# Patient Record
Sex: FEMALE | Race: WHITE | NOT HISPANIC OR LATINO | Employment: FULL TIME | URBAN - METROPOLITAN AREA
[De-identification: names, ages, dates, MRNs, and addresses within clinical notes are randomized per-mention and may not be internally consistent; named-entity substitution may affect disease eponyms.]

---

## 2017-12-22 ENCOUNTER — OFFICE VISIT (OUTPATIENT)
Dept: FAMILY MEDICINE | Facility: CLINIC | Age: 42
End: 2017-12-22
Payer: COMMERCIAL

## 2017-12-22 VITALS
BODY MASS INDEX: 38.57 KG/M2 | HEART RATE: 110 BPM | DIASTOLIC BLOOD PRESSURE: 92 MMHG | TEMPERATURE: 99.2 F | WEIGHT: 240 LBS | SYSTOLIC BLOOD PRESSURE: 133 MMHG | HEIGHT: 66 IN

## 2017-12-22 DIAGNOSIS — J02.9 VIRAL PHARYNGITIS: Primary | ICD-10-CM

## 2017-12-22 DIAGNOSIS — R07.0 THROAT PAIN: ICD-10-CM

## 2017-12-22 LAB
DEPRECATED S PYO AG THROAT QL EIA: NORMAL
SPECIMEN SOURCE: NORMAL

## 2017-12-22 PROCEDURE — 87880 STREP A ASSAY W/OPTIC: CPT | Performed by: PHYSICIAN ASSISTANT

## 2017-12-22 PROCEDURE — 99213 OFFICE O/P EST LOW 20 MIN: CPT | Performed by: PHYSICIAN ASSISTANT

## 2017-12-22 PROCEDURE — 87081 CULTURE SCREEN ONLY: CPT | Performed by: PHYSICIAN ASSISTANT

## 2017-12-22 NOTE — NURSING NOTE
"Chief Complaint   Patient presents with     Pharyngitis       Initial BP (!) 133/92  Pulse 110  Temp 99.2  F (37.3  C) (Tympanic)  Ht 5' 6\" (1.676 m)  Wt 240 lb (108.9 kg)  LMP 12/15/2017 (Approximate)  Breastfeeding? No  BMI 38.74 kg/m2 Estimated body mass index is 38.74 kg/(m^2) as calculated from the following:    Height as of this encounter: 5' 6\" (1.676 m).    Weight as of this encounter: 240 lb (108.9 kg).  Medication Reconciliation: complete     Irma Morales MA      "

## 2017-12-22 NOTE — PROGRESS NOTES
SUBJECTIVE:   Daylin Amaya is a 42 year old female who presents to clinic today for the following health issues:      ENT Symptoms             Symptoms: cc Present Absent Comment   Fever/Chills   x    Fatigue  x     Muscle Aches   x    Eye Irritation   x    Sneezing  x     Nasal Yunior/Drg  x     Sinus Pressure/Pain  x     Loss of smell   x    Dental pain   x    Sore Throat  x     Swollen Glands  x     Ear Pain/Fullness  x  right   Cough   x    Wheeze   x    Chest Pain   x    Shortness of breath   x    Rash   x    Other  x       Symptom duration:  this morning   Symptom severity:  moderate   Treatments tried:  None    Contacts:  son vomited last night        Right ear and sinus pressure.  No cough.        Problem list and histories reviewed & adjusted, as indicated.  Additional history: as documented    Patient Active Problem List   Diagnosis     CARDIOVASCULAR SCREENING; LDL GOAL LESS THAN 160     24 hour contact handout given     History reviewed. No pertinent surgical history.    Social History   Substance Use Topics     Smoking status: Never Smoker     Smokeless tobacco: Never Used     Alcohol use No     Family History   Problem Relation Age of Onset     Thyroid Disease Mother      CANCER Father      Thyroid Disease Sister      Allergies Son      food allergies, sees Dr Ambrocio         Current Outpatient Prescriptions   Medication Sig Dispense Refill     acetaminophen-codeine 120-12 MG/5ML suspension Take 5 mLs by mouth every 6 hours as needed for moderate pain 420 mL 0     albuterol (PROAIR HFA, PROVENTIL HFA, VENTOLIN HFA) 108 (90 BASE) MCG/ACT inhaler Inhale 2 puffs into the lungs every 4 hours as needed for shortness of breath / dyspnea or wheezing 1 Inhaler 3     sertraline (ZOLOFT) 50 MG tablet Take 50 mg by mouth daily       melatonin 3 MG tablet Take 3 mg by mouth At Bedtime       guaiFENesin-codeine (ROBITUSSIN AC) 100-10 MG/5ML SOLN Take 5 mLs by mouth every 4 hours as needed for cough (Patient not  "taking: Reported on 12/22/2017) 240 mL 0     ibuprofen (RA IBUPROFEN) 200 MG capsule Take 200-600 mg by mouth every 6 hours as needed       norgestrel-ethinyl estradiol (CRYSELLE-28) 0.3-30 MG-MCG per tablet Take 1 tablet by mouth daily       loratadine (CLARITIN) 10 MG tablet Take 10 mg by mouth daily       benzonatate (TESSALON) 200 MG capsule Take 1 capsule (200 mg) by mouth 3 times daily as needed for cough (Patient not taking: Reported on 12/22/2017) 21 capsule 0     BP Readings from Last 3 Encounters:   12/22/17 (!) 133/92   11/17/16 124/68   11/07/16 136/80    Wt Readings from Last 3 Encounters:   12/22/17 240 lb (108.9 kg)   11/17/16 243 lb 3.2 oz (110.3 kg)   11/07/16 239 lb 3.2 oz (108.5 kg)                        Reviewed and updated as needed this visit by clinical staffTobacco  Allergies  Meds  Med Hx  Surg Hx  Fam Hx  Soc Hx      Reviewed and updated as needed this visit by Provider         ROS:  Constitutional, HEENT, cardiovascular, pulmonary, GI, , musculoskeletal, neuro, skin, endocrine and psych systems are negative, except as otherwise noted.      OBJECTIVE:   BP (!) 133/92  Pulse 110  Temp 99.2  F (37.3  C) (Tympanic)  Ht 5' 6\" (1.676 m)  Wt 240 lb (108.9 kg)  LMP 12/15/2017 (Approximate)  Breastfeeding? No  BMI 38.74 kg/m2  Body mass index is 38.74 kg/(m^2).  GENERAL: healthy, alert and no distress  EYES: Eyes grossly normal to inspection, PERRL and conjunctivae and sclerae normal  HENT: ear canals and TM's normal, nose and mouth without ulcers or lesions, tonsils erythematous, no exudates.  NECK: no adenopathy, no asymmetry, masses, or scars and thyroid normal to palpation  RESP: lungs clear to auscultation - no rales, rhonchi or wheezes  CV: regular rate and rhythm, normal S1 S2, no S3 or S4, no murmur, click or rub, no peripheral edema and peripheral pulses strong  ABDOMEN: soft, nontender, no hepatosplenomegaly, no masses and bowel sounds normal  MS: no gross musculoskeletal " defects noted, no edema    Diagnostic Test Results:  Results for orders placed or performed in visit on 12/22/17 (from the past 24 hour(s))   Rapid strep screen   Result Value Ref Range    Specimen Description Throat     Rapid Strep A Screen       NEGATIVE: No Group A streptococcal antigen detected by immunoassay, await culture report.       ASSESSMENT/PLAN:         1. Viral pharyngitis  Pharyngitis  (primary encounter diagnosis)     I discussed the pathophysiology of pharyngitis and likely viral etiology.   Discussed general respiratory tract infection care including importance of hydration, rest, over the counter therapies and techniques to prevent future infection as well as transmission to others.  Discussed signs or symptoms that would indicate need for recheck.    Patient was instructed to f/u or call if symptoms worsen or fail to improve as anticipated.      I warned Daylin Amaya that tylenol with codeine may cause drowsiness and she is not to drive or operate heavy machinery after taking this medication.     - acetaminophen-codeine 120-12 MG/5ML suspension; Take 5 mLs by mouth every 6 hours as needed for moderate pain  Dispense: 420 mL; Refill: 0    2. Throat pain  Rapid strep was neg, will call if culture is positive   - Rapid strep screen  - Beta strep group A culture    FUTURE APPOINTMENTS:       - Follow-up visit if symptoms worsen or fail to improve as anticipated.     Mey Vilchis PA-C  [facility]

## 2017-12-22 NOTE — PATIENT INSTRUCTIONS
Self-Care for Sore Throats    Sore throats happen for many reasons, such as colds, allergies, and infections caused by viruses or bacteria. In any case, your throat becomes red and sore. Your goal for self-care is to reduce your discomfort while giving your throat a chance to heal.  Moisten and soothe your throat  Tips include the following:    Try a sip of water first thing after waking up.    Keep your throat moist by drinking 6 or more glasses of clear liquids every day.    Run a cool-air humidifier in your room overnight.    Avoid cigarette smoke.     Suck on throat lozenges, cough drops, hard candy, ice chips, or frozen fruit-juice bars. Use the sugar-free versions if your diet or medical condition requires them.  Gargle to ease irritation  Gargling every hour or 2 can ease irritation. Try gargling with 1 of these solutions:    1/4 teaspoon of salt in 1/2 cup of warm water    An over-the-counter anesthetic gargle  Use medicine for more relief  Over-the-counter medicine can reduce sore throat symptoms. Ask your pharmacist if you have questions about which medicine to use:    Ease pain with anesthetic sprays. Aspirin or an aspirin substitute also helps. Remember, never give aspirin to anyone 18 or younger, or if you are already taking blood thinners.     For sore throats caused by allergies, try antihistamines to block the allergic reaction.    Remember: unless a sore throat is caused by a bacterial infection, antibiotics won t help you.  Prevent future sore throats  Prevention tips include the following:    Stop smoking or reduce contact with secondhand smoke. Smoke irritates the tender throat lining.    Limit contact with pets and with allergy-causing substances, such as pollen and mold.    When you re around someone with a sore throat or cold, wash your hands often to keep viruses or bacteria from spreading.    Don t strain your vocal cords.  Call your healthcare provider  Contact your healthcare provider if  you have:    A temperature over 101 F (38.3 C)    White spots on the throat    Great difficulty swallowing    Trouble breathing    A skin rash    Recent exposure to someone else with strep bacteria    Severe hoarseness and swollen glands in the neck or jaw   Date Last Reviewed: 8/1/2016 2000-2017 [employer]. [address] [place], [place] [id number]. All rights reserved. This information is not intended as a substitute for professional medical care. Always follow your healthcare professional's instructions.

## 2017-12-22 NOTE — MR AVS SNAPSHOT
After Visit Summary   12/22/2017    Daylin Amaya    MRN: [id number]           Patient Information     Date Of Birth          1975        Visit Information        Provider Department      12/22/2017 11:00 AM Mey Vilchis PA-C [facility]        Today's Diagnoses     Viral pharyngitis    -  1    Throat pain          Care Instructions      Self-Care for Sore Throats    Sore throats happen for many reasons, such as colds, allergies, and infections caused by viruses or bacteria. In any case, your throat becomes red and sore. Your goal for self-care is to reduce your discomfort while giving your throat a chance to heal.  Moisten and soothe your throat  Tips include the following:    Try a sip of water first thing after waking up.    Keep your throat moist by drinking 6 or more glasses of clear liquids every day.    Run a cool-air humidifier in your room overnight.    Avoid cigarette smoke.     Suck on throat lozenges, cough drops, hard candy, ice chips, or frozen fruit-juice bars. Use the sugar-free versions if your diet or medical condition requires them.  Gargle to ease irritation  Gargling every hour or 2 can ease irritation. Try gargling with 1 of these solutions:    1/4 teaspoon of salt in 1/2 cup of warm water    An over-the-counter anesthetic gargle  Use medicine for more relief  Over-the-counter medicine can reduce sore throat symptoms. Ask your pharmacist if you have questions about which medicine to use:    Ease pain with anesthetic sprays. Aspirin or an aspirin substitute also helps. Remember, never give aspirin to anyone 18 or younger, or if you are already taking blood thinners.     For sore throats caused by allergies, try antihistamines to block the allergic reaction.    Remember: unless a sore throat is caused by a bacterial infection, antibiotics won t help you.  Prevent future sore throats  Prevention tips include the following:    Stop smoking or reduce  contact with secondhand smoke. Smoke irritates the tender throat lining.    Limit contact with pets and with allergy-causing substances, such as pollen and mold.    When you re around someone with a sore throat or cold, wash your hands often to keep viruses or bacteria from spreading.    Don t strain your vocal cords.  Call your healthcare provider  Contact your healthcare provider if you have:    A temperature over 101 F (38.3 C)    White spots on the throat    Great difficulty swallowing    Trouble breathing    A skin rash    Recent exposure to someone else with strep bacteria    Severe hoarseness and swollen glands in the neck or jaw   Date Last Reviewed: 8/1/2016 2000-2017 [employer]. [address] [place] [address]. All rights reserved. This information is not intended as a substitute for professional medical care. Always follow your healthcare professional's instructions.                Follow-ups after your visit        Who to contact     Normal or non-critical lab and imaging results will be communicated to you by [employer], letter or phone within 4 business days after the clinic has received the results. If you do not hear from us within 7 days, please contact the clinic through [employer] or phone. If you have a critical or abnormal lab result, we will notify you by phone as soon as possible.  Submit refill requests through [employer] or call your pharmacy and they will forward the refill request to us. Please allow 3 business days for your refill to be completed.          If you need to speak with a  for additional information , please call: [phone]           Additional Information About Your Visit        [employer] Information     [employer] gives you secure access to your electronic health record. If you see a primary care provider, you can also send messages to your care team and make appointments. If you have questions, please call your primary care clinic.  If you  "do not have a primary care provider, please call [phone] and they will assist you.        Care EveryWhere ID     This is your Care EveryWhere ID. This could be used by other organizations to access your [facility] medical records  [id number]        Your Vitals Were     Pulse Temperature Height Last Period Breastfeeding? BMI (Body Mass Index)    110 99.2  F (37.3  C) (Tympanic) 5' 6\" (1.676 m) 12/15/2017 (Approximate) No 38.74 kg/m2       Blood Pressure from Last 3 Encounters:   12/22/17 (!) 133/92   11/17/16 124/68   11/07/16 136/80    Weight from Last 3 Encounters:   12/22/17 240 lb (108.9 kg)   11/17/16 243 lb 3.2 oz (110.3 kg)   11/07/16 239 lb 3.2 oz (108.5 kg)              We Performed the Following     Beta strep group A culture     Rapid strep screen          Today's Medication Changes          These changes are accurate as of: 12/22/17 11:40 AM.  If you have any questions, ask your nurse or doctor.               Start taking these medicines.        Dose/Directions    acetaminophen-codeine 120-12 MG/5ML suspension   Used for:  Viral pharyngitis   Started by:  Mey Vilchis PA-C        Dose:  5 mL   Take 5 mLs by mouth every 6 hours as needed for moderate pain   Quantity:  420 mL   Refills:  0            Where to get your medicines      Some of these will need a paper prescription and others can be bought over the counter.  Ask your nurse if you have questions.     Bring a paper prescription for each of these medications     acetaminophen-codeine 120-12 MG/5ML suspension                Primary Care Provider Office Phone # Fax #    TIFFANY Brooks [facility] [phone] [place] [address]        Equal Access to Services     EDWIN MOORE AH: Carmen Negrete, waaxda luqadaha, qaybta kaalmada adeflorecitayada, sofi snyder. So [facility] [phone].    ATENCIÓN: Si habla español, tiene a lakhani disposición servicios gratuitos de " asistencia lingüística. Cristian al [phone].    We comply with applicable federal civil rights laws and [place] laws. We do not discriminate on the basis of race, color, national origin, age, disability, sex, sexual orientation, or gender identity.            Thank you!     Thank you for choosing [facility]  for your care. Our goal is always to provide you with excellent care. Hearing back from our patients is one way we can continue to improve our services. Please take a few minutes to complete the written survey that you may receive in the mail after your visit with us. Thank you!             Your Updated Medication List - Protect others around you: Learn how to safely use, store and throw away your medicines at www.disposemymeds.org.          This list is accurate as of: 12/22/17 11:40 AM.  Always use your most recent med list.                   Brand Name Dispense Instructions for use Diagnosis    acetaminophen-codeine 120-12 MG/5ML suspension     420 mL    Take 5 mLs by mouth every 6 hours as needed for moderate pain    Viral pharyngitis       albuterol 108 (90 BASE) MCG/ACT Inhaler    PROAIR HFA/PROVENTIL HFA/VENTOLIN HFA    1 Inhaler    Inhale 2 puffs into the lungs every 4 hours as needed for shortness of breath / dyspnea or wheezing    Acute bronchitis, unspecified organism       benzonatate 200 MG capsule    TESSALON    21 capsule    Take 1 capsule (200 mg) by mouth 3 times daily as needed for cough    Acute bronchitis, unspecified organism       CRYSELLE-28 0.3-30 MG-MCG per tablet   Generic drug:  norgestrel-ethinyl estradiol      Take 1 tablet by mouth daily        guaiFENesin-codeine 100-10 MG/5ML Soln solution    ROBITUSSIN AC    240 mL    Take 5 mLs by mouth every 4 hours as needed for cough    Acute bronchitis, unspecified organism       loratadine 10 MG tablet    CLARITIN     Take 10 mg by mouth daily        melatonin 3 MG tablet      Take 3 mg by mouth At Bedtime        RA  IBUPROFEN 200 MG capsule   Generic drug:  ibuprofen      Take 200-600 mg by mouth every 6 hours as needed        sertraline 50 MG tablet    ZOLOFT     Take 50 mg by mouth daily

## 2017-12-23 LAB
BACTERIA SPEC CULT: NORMAL
SPECIMEN SOURCE: NORMAL

## 2019-05-24 LAB — TSH SERPL-ACNC: 2.3 UIU/ML (ref 0.35–4.94)

## 2019-11-04 ENCOUNTER — HEALTH MAINTENANCE LETTER (OUTPATIENT)
Age: 44
End: 2019-11-04

## 2020-02-16 ENCOUNTER — HEALTH MAINTENANCE LETTER (OUTPATIENT)
Age: 45
End: 2020-02-16

## 2020-07-28 LAB
HPV ABSTRACT: NORMAL
PAP-ABSTRACT: NORMAL

## 2020-11-22 ENCOUNTER — HEALTH MAINTENANCE LETTER (OUTPATIENT)
Age: 45
End: 2020-11-22

## 2021-02-13 ENCOUNTER — HEALTH MAINTENANCE LETTER (OUTPATIENT)
Age: 46
End: 2021-02-13

## 2021-04-28 ENCOUNTER — TELEPHONE (OUTPATIENT)
Dept: FAMILY MEDICINE | Facility: CLINIC | Age: 46
End: 2021-04-28

## 2021-04-28 NOTE — TELEPHONE ENCOUNTER
"Message left on patient's answering machine to call the [facility] RN back. Need to triage her reason for the appointment that she scheduled for 4/30/21 with Deyanira Jackson at 10:40 AM. \"I fell a few weeks ago and I have a sore calf with swelling thatI would like looked at. Nothing broken as I can walk just fine.\"  Sharan Benjamin RN   "

## 2021-04-29 NOTE — TELEPHONE ENCOUNTER
Call placed to patient.  Voicemail message left requesting call back clinic RN to discuss symptoms regarding scheduled visit tomorrow as she may need a higher level of care for this evaluation.  Call back number given, option 2 care team.  Aimee Benitez RN

## 2021-04-30 ENCOUNTER — OFFICE VISIT (OUTPATIENT)
Dept: FAMILY MEDICINE | Facility: CLINIC | Age: 46
End: 2021-04-30
Payer: COMMERCIAL

## 2021-04-30 ENCOUNTER — ANCILLARY PROCEDURE (OUTPATIENT)
Dept: GENERAL RADIOLOGY | Facility: CLINIC | Age: 46
End: 2021-04-30
Attending: PHYSICIAN ASSISTANT
Payer: COMMERCIAL

## 2021-04-30 VITALS
TEMPERATURE: 99.4 F | WEIGHT: 257.3 LBS | SYSTOLIC BLOOD PRESSURE: 138 MMHG | RESPIRATION RATE: 15 BRPM | HEIGHT: 66 IN | BODY MASS INDEX: 41.35 KG/M2 | HEART RATE: 79 BPM | DIASTOLIC BLOOD PRESSURE: 82 MMHG | OXYGEN SATURATION: 97 %

## 2021-04-30 DIAGNOSIS — M79.662 PAIN OF LEFT LOWER LEG: ICD-10-CM

## 2021-04-30 DIAGNOSIS — M79.662 PAIN OF LEFT LOWER LEG: Primary | ICD-10-CM

## 2021-04-30 DIAGNOSIS — L91.8 SKIN TAG: ICD-10-CM

## 2021-04-30 PROBLEM — N84.0 UTERINE POLYP: Status: ACTIVE | Noted: 2019-06-10

## 2021-04-30 PROCEDURE — 11200 RMVL SKIN TAGS UP TO&INC 15: CPT | Performed by: PHYSICIAN ASSISTANT

## 2021-04-30 PROCEDURE — 99213 OFFICE O/P EST LOW 20 MIN: CPT | Mod: 25 | Performed by: PHYSICIAN ASSISTANT

## 2021-04-30 PROCEDURE — 73590 X-RAY EXAM OF LOWER LEG: CPT | Mod: LT | Performed by: RADIOLOGY

## 2021-04-30 RX ORDER — NORETHINDRONE ACETATE AND ETHINYL ESTRADIOL .02; 1 MG/1; MG/1
TABLET ORAL
COMMUNITY
Start: 2021-03-25 | End: 2022-04-28

## 2021-04-30 ASSESSMENT — MIFFLIN-ST. JEOR: SCORE: 1828.86

## 2021-04-30 NOTE — PROGRESS NOTES
"    Assessment & Plan     ASSESSMENT/PLAN:      ICD-10-CM    1. Pain of left lower leg  M79.662 XR Tibia & Fibula Left 2 Views   2. Skin tag  L91.8 REMOVAL OF SKIN TAGS, FIRST 15     Discussed options for swelling/injury/hematoma: patient elected to give it time. Can try heat/ice/light massage, keep stretching ankle. She will let me know if not improving, consider ultrasound or physical therapy. Signs to be seen again were discussed.    Return in about 2 weeks (around 5/14/2021) for if not improving or if worsening.    DENISE Jane [facility] DAMARIS Mao is a 45 year old who presents for the following health issues     HPI   *  I fell a few weeks ago and I have a sore calf with swelling that I would like   looked at. Nothing is broken as I can walk just fine. I also have a few skin   tags near my bra line that I would like removed that hurt due to the location.    About amonth ago she tumbled while hiking, hit left shin area.   She walks 3 miles at lunch daily, this is okay but it is painful  Was vacuuming last week, any pressure to this area hurts  Doesn't hurt to move ankle      Review of Systems   Other than noted above, general, HEENT, respiratory, cardiac, MS, and gastrointestinal systems are negative.       Objective    /82   Pulse 79   Temp 99.4  F (37.4  C) (Tympanic)   Resp 15   Ht 1.676 m (5' 6\")   Wt 116.7 kg (257 lb 4.8 oz)   SpO2 97%   BMI 41.53 kg/m    Body mass index is 41.53 kg/m .  Physical Exam   GENERAL: healthy, alert and no distress  RESP: lungs clear to auscultation - no rales, rhonchi or wheezes  CV: regular rate and rhythm, normal S1 S2, no S3 or S4, no murmur, click or rub, no peripheral edema and peripheral pulses strong  ABDOMEN: soft, nontender, no hepatosplenomegaly, no masses and bowel sounds normal  MS:  no edema  MS: POSITIVE left lower leg shows tender swelling over proximal tibia, no ecchymosis seen, approximately the size of a " palm. No erythema, induration, edema, no calf swelling or tenderness   SKIN: POSITIVE two skin tags on stalks right axilla, 1 small skin tag left axilla  NEURO: Normal strength and tone, mentation intact and speech normal    After cleaning with alcohol and using a sharp iris scissors, 3 skin tags were removed without complication. Hemostasis achieved with pressure, dressed with antibiotic cream and bandaids.       Xr Tibia & Fibula Left 2 Views    Result Date: 4/30/2021  XR LEFT TIBIA AND FIBULA TWO VIEWS  4/30/2021 11:46 AM HISTORY: Fall 1 month ago, swelling to shin/pain. Pain of left lower leg. COMPARISON: None.     IMPRESSION: Proximal and distal tibial and fibular articulations intact. No acute fracture. RENAY WALTERS MD

## 2021-08-21 ENCOUNTER — HEALTH MAINTENANCE LETTER (OUTPATIENT)
Age: 46
End: 2021-08-21

## 2021-09-19 ENCOUNTER — HEALTH MAINTENANCE LETTER (OUTPATIENT)
Age: 46
End: 2021-09-19

## 2021-10-16 ENCOUNTER — HEALTH MAINTENANCE LETTER (OUTPATIENT)
Age: 46
End: 2021-10-16

## 2021-12-23 ENCOUNTER — TRANSFERRED RECORDS (OUTPATIENT)
Dept: MULTI SPECIALTY CLINIC | Facility: CLINIC | Age: 46
End: 2021-12-23
Payer: COMMERCIAL

## 2021-12-23 LAB
CHOLESTEROL (EXTERNAL): 152 MG/DL (ref 100–199)
GLUCOSE (EXTERNAL): 110 MG/DL (ref 65–100)
HBA1C MFR BLD: 5.8 %
HDLC SERPL-MCNC: 50 MG/DL
LDL CHOLESTEROL (EXTERNAL): 86 MG/DL
NON HDL CHOLESTEROL (EXTERNAL): 102 MG/DL
TRIGLYCERIDES (EXTERNAL): 81 MG/DL

## 2021-12-27 ENCOUNTER — IMMUNIZATION (OUTPATIENT)
Dept: FAMILY MEDICINE | Facility: CLINIC | Age: 46
End: 2021-12-27
Payer: COMMERCIAL

## 2021-12-27 PROCEDURE — 0004A PR COVID VAC PFIZER DIL RECON 30 MCG/0.3 ML IM: CPT

## 2021-12-27 PROCEDURE — 91300 PR COVID VAC PFIZER DIL RECON 30 MCG/0.3 ML IM: CPT

## 2022-01-08 ENCOUNTER — HEALTH MAINTENANCE LETTER (OUTPATIENT)
Age: 47
End: 2022-01-08

## 2022-04-28 ENCOUNTER — OFFICE VISIT (OUTPATIENT)
Dept: FAMILY MEDICINE | Facility: CLINIC | Age: 47
End: 2022-04-28
Payer: COMMERCIAL

## 2022-04-28 ENCOUNTER — HOSPITAL ENCOUNTER (OUTPATIENT)
Dept: ULTRASOUND IMAGING | Facility: CLINIC | Age: 47
Discharge: HOME | End: 2022-04-28
Attending: FAMILY MEDICINE | Admitting: FAMILY MEDICINE
Payer: COMMERCIAL

## 2022-04-28 VITALS
WEIGHT: 261.6 LBS | SYSTOLIC BLOOD PRESSURE: 131 MMHG | RESPIRATION RATE: 20 BRPM | BODY MASS INDEX: 42.22 KG/M2 | HEART RATE: 87 BPM | TEMPERATURE: 97.1 F | DIASTOLIC BLOOD PRESSURE: 77 MMHG

## 2022-04-28 DIAGNOSIS — M79.89 PAIN AND SWELLING OF LEFT LOWER EXTREMITY: ICD-10-CM

## 2022-04-28 DIAGNOSIS — I80.02 SUPERFICIAL THROMBOPHLEBITIS OF LEFT LEG: ICD-10-CM

## 2022-04-28 DIAGNOSIS — M79.605 PAIN AND SWELLING OF LEFT LOWER EXTREMITY: ICD-10-CM

## 2022-04-28 DIAGNOSIS — M79.89 PAIN AND SWELLING OF LEFT LOWER EXTREMITY: Primary | ICD-10-CM

## 2022-04-28 DIAGNOSIS — M79.605 PAIN AND SWELLING OF LEFT LOWER EXTREMITY: Primary | ICD-10-CM

## 2022-04-28 PROCEDURE — 93971 EXTREMITY STUDY: CPT | Mod: LT

## 2022-04-28 PROCEDURE — 99215 OFFICE O/P EST HI 40 MIN: CPT | Performed by: FAMILY MEDICINE

## 2022-04-28 ASSESSMENT — ENCOUNTER SYMPTOMS: LEG PAIN: 1

## 2022-04-28 NOTE — PROGRESS NOTES
"  Assessment & Plan     Pain and swelling of left lower extremity  Erythema is not particularly concerning for cellulitis or erysipelas.  Patient has no history of MRSA and is low risk for this.  No fever or other systemic symptoms.  Risk factors for blood clot include obesity, sedentary job, and OCP use.  She is a non-smoker.  We will send her for a hold and call ultrasound to rule out left lower extremity DVT.  If no DVT is found, consider antibiotics for possible cellulitis.  - US Lower Extremity Venous Duplex Left; Future      Addendum: Hold and call ultrasound revealed an extensive superficial clot in the greater saphenous vein.  This traversed the saphenous popliteal junction, thusly will treat with therapeutic anticoagulation per DVT protocol.  Patient will stop her OCP and start Xarelto 15 mg twice daily for 21 days, then 20 mg daily for a total of 3 months.  Discussed that this is likely a provoked clot due to sedentary job and OCP.  She will work with her OB/GYN if she has menorrhagia after stopping her OCP.  Follow-up for repeat ultrasound only if redness and pain persist past 1 to 2 weeks.        A total of 40 minutes was spent on the date of the encounter and reviewing the chart, examining the patient, speaking with the radiologist, and discussing treatment with patient over the phone.       BMI:   Estimated body mass index is 42.22 kg/m  as calculated from the following:    Height as of 4/30/21: 1.676 m (5' 6\").    Weight as of this encounter: 118.7 kg (261 lb 9.6 oz).           Return in about 4 weeks (around 5/26/2022) for Routine preventive.    Reema Amaya MD  [facility]    Dany Mao is a 46 year old who presents for the following health issues     Leg Pain    History of Present Illness       Reason for visit:  Skin swelled on back of leg, little painful and has not improved  Symptom onset:  1-3 days ago  Symptoms include:  Tender and swollen area back of " leg  Symptom intensity:  Moderate  Symptom progression:  Worsening  Had these symptoms before:  No  What makes it worse:  Bending leg  What makes it better:  Feet up    She eats 2-3 servings of fruits and vegetables daily.She consumes 0 sweetened beverage(s) daily.She exercises with enough effort to increase her heart rate 30 to 60 minutes per day.  She exercises with enough effort to increase her heart rate 4 days per week.   She is taking medications regularly.     Patient presents today with a concern for possible cellulitis of her left lower extremity.  She states that over the past 4 days, she has noticed a mild amount of redness to the posterior aspect of the left knee.  Last night, this seemed to spread into her calf and up into her thigh along with some pain in those areas.  The area seemed more red.  She has had a skin infection once in the past in her finger that seemed to spread up her arm and required oral antibiotic treatment.  She does not remember that this was cultured.  She has not been hospitalized recently, is not immunosuppressed.  She denies recent fevers or chills, has been feeling well.  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  She is taking an OCP for menorrhagia.  She is a non-smoker.  She has never had a blood clot in the past.      Review of Systems   Constitutional, HEENT, cardiovascular, pulmonary, gi and gu systems are negative, except as otherwise noted.      Objective    /77 (BP Location: Left arm, Patient Position: Sitting, Cuff Size: Adult Large)   Pulse 87   Temp 97.1  F (36.2  C) (Oral)   Resp 20   Wt 118.7 kg (261 lb 9.6 oz)   BMI 42.22 kg/m    Body mass index is 42.22 kg/m .  Physical Exam   GENERAL: healthy, alert and no distress  RESP: Breathing unlabored, no cough during the visit  MS: Patient has somewhat large calf muscles bilaterally, no obvious increase in diameter of the left calf compared  to the right, but possibly some increased mottled appearance to the skin along with tenderness in the popliteal fossa and inner thigh on the left  SKIN: Increased mottled appearance to the left calf compared to the right, stretching up to the medial aspect of the knee and medial thigh, no angry erythema, no obvious warmth, no open areas  NEURO: Normal strength and tone, mentation intact and speech normal  PSYCH: mentation appears normal, affect normal/bright    Diagnostics: Left lower extremity ultrasound pending

## 2022-05-10 ENCOUNTER — MYC MEDICAL ADVICE (OUTPATIENT)
Dept: FAMILY MEDICINE | Facility: CLINIC | Age: 47
End: 2022-05-10
Payer: COMMERCIAL

## 2022-05-11 ENCOUNTER — NURSE TRIAGE (OUTPATIENT)
Dept: FAMILY MEDICINE | Facility: CLINIC | Age: 47
End: 2022-05-11
Payer: COMMERCIAL

## 2022-05-11 ENCOUNTER — APPOINTMENT (OUTPATIENT)
Dept: ULTRASOUND IMAGING | Facility: CLINIC | Age: 47
End: 2022-05-11
Attending: NURSE PRACTITIONER
Payer: COMMERCIAL

## 2022-05-11 ENCOUNTER — HOSPITAL ENCOUNTER (EMERGENCY)
Facility: CLINIC | Age: 47
Discharge: HOME | End: 2022-05-11
Attending: NURSE PRACTITIONER | Admitting: NURSE PRACTITIONER
Payer: COMMERCIAL

## 2022-05-11 VITALS
OXYGEN SATURATION: 99 % | WEIGHT: 255 LBS | HEIGHT: 67 IN | BODY MASS INDEX: 40.02 KG/M2 | RESPIRATION RATE: 16 BRPM | SYSTOLIC BLOOD PRESSURE: 125 MMHG | TEMPERATURE: 97.5 F | HEART RATE: 96 BPM | DIASTOLIC BLOOD PRESSURE: 82 MMHG

## 2022-05-11 DIAGNOSIS — N93.9 VAGINAL BLEEDING: ICD-10-CM

## 2022-05-11 LAB
ALBUMIN SERPL-MCNC: 3.2 G/DL (ref 3.4–5)
ALP SERPL-CCNC: 61 U/L (ref 40–150)
ALT SERPL W P-5'-P-CCNC: 31 U/L (ref 0–50)
ANION GAP SERPL CALCULATED.3IONS-SCNC: 4 MMOL/L (ref 3–14)
AST SERPL W P-5'-P-CCNC: 17 U/L (ref 0–45)
BASOPHILS # BLD AUTO: 0 10E3/UL (ref 0–0.2)
BASOPHILS NFR BLD AUTO: 1 %
BILIRUB SERPL-MCNC: 0.2 MG/DL (ref 0.2–1.3)
BUN SERPL-MCNC: 12 MG/DL (ref 7–30)
CALCIUM SERPL-MCNC: 8.6 MG/DL (ref 8.5–10.1)
CHLORIDE BLD-SCNC: 110 MMOL/L (ref 94–109)
CO2 SERPL-SCNC: 27 MMOL/L (ref 20–32)
CREAT SERPL-MCNC: 0.78 MG/DL (ref 0.52–1.04)
EOSINOPHIL # BLD AUTO: 0.1 10E3/UL (ref 0–0.7)
EOSINOPHIL NFR BLD AUTO: 2 %
ERYTHROCYTE [DISTWIDTH] IN BLOOD BY AUTOMATED COUNT: 13.9 % (ref 10–15)
GFR SERPL CREATININE-BSD FRML MDRD: >90 ML/MIN/1.73M2
GLUCOSE BLD-MCNC: 106 MG/DL (ref 70–99)
HCT VFR BLD AUTO: 34.7 % (ref 35–47)
HGB BLD-MCNC: 11.1 G/DL (ref 11.7–15.7)
HOLD SPECIMEN: NORMAL
IMM GRANULOCYTES # BLD: 0.1 10E3/UL
IMM GRANULOCYTES NFR BLD: 1 %
LYMPHOCYTES # BLD AUTO: 3.5 10E3/UL (ref 0.8–5.3)
LYMPHOCYTES NFR BLD AUTO: 42 %
MCH RBC QN AUTO: 28.3 PG (ref 26.5–33)
MCHC RBC AUTO-ENTMCNC: 32 G/DL (ref 31.5–36.5)
MCV RBC AUTO: 89 FL (ref 78–100)
MONOCYTES # BLD AUTO: 0.5 10E3/UL (ref 0–1.3)
MONOCYTES NFR BLD AUTO: 6 %
NEUTROPHILS # BLD AUTO: 4 10E3/UL (ref 1.6–8.3)
NEUTROPHILS NFR BLD AUTO: 48 %
NRBC # BLD AUTO: 0 10E3/UL
NRBC BLD AUTO-RTO: 0 /100
PLATELET # BLD AUTO: 82 10E3/UL (ref 150–450)
POTASSIUM BLD-SCNC: 3.7 MMOL/L (ref 3.4–5.3)
PROT SERPL-MCNC: 6.3 G/DL (ref 6.8–8.8)
RBC # BLD AUTO: 3.92 10E6/UL (ref 3.8–5.2)
SODIUM SERPL-SCNC: 141 MMOL/L (ref 133–144)
WBC # BLD AUTO: 8.3 10E3/UL (ref 4–11)

## 2022-05-11 PROCEDURE — 80053 COMPREHEN METABOLIC PANEL: CPT | Performed by: NURSE PRACTITIONER

## 2022-05-11 PROCEDURE — 99284 EMERGENCY DEPT VISIT MOD MDM: CPT | Performed by: NURSE PRACTITIONER

## 2022-05-11 PROCEDURE — 36415 COLL VENOUS BLD VENIPUNCTURE: CPT | Performed by: EMERGENCY MEDICINE

## 2022-05-11 PROCEDURE — 96360 HYDRATION IV INFUSION INIT: CPT | Performed by: NURSE PRACTITIONER

## 2022-05-11 PROCEDURE — 85025 COMPLETE CBC W/AUTO DIFF WBC: CPT | Performed by: NURSE PRACTITIONER

## 2022-05-11 PROCEDURE — 99284 EMERGENCY DEPT VISIT MOD MDM: CPT | Mod: 25 | Performed by: NURSE PRACTITIONER

## 2022-05-11 PROCEDURE — 96361 HYDRATE IV INFUSION ADD-ON: CPT | Performed by: NURSE PRACTITIONER

## 2022-05-11 PROCEDURE — 93971 EXTREMITY STUDY: CPT | Mod: LT

## 2022-05-11 PROCEDURE — 258N000003 HC RX IP 258 OP 636: Performed by: NURSE PRACTITIONER

## 2022-05-11 RX ORDER — SODIUM CHLORIDE 9 MG/ML
INJECTION, SOLUTION INTRAVENOUS CONTINUOUS
Status: DISCONTINUED | OUTPATIENT
Start: 2022-05-11 | End: 2022-05-11 | Stop reason: HOSPADM

## 2022-05-11 RX ADMIN — SODIUM CHLORIDE 1000 ML: 9 INJECTION, SOLUTION INTRAVENOUS at 14:13

## 2022-05-11 NOTE — TELEPHONE ENCOUNTER
"Called patient and spoke with her regarding [facility] [employer] message on 5/10. Patient states that she been having heavy vaginal bleeding starting on 5/1. Of note, patient was started on Xarelto 4/28; her last dose was 5/10 at 630pm. Bleeding has been getting progressively more constant throughout the last week. Within the last 2 days the bleeding has increased to a severe level with her soaking a tampon and pad every 15 minutes and passing large jelly-like clots the size of half dollar to golf ball. Patient has been able to work from home but is starting to feel worn out and tired stating she \"feels like she should could go lay down\". Denies dizziness or abdominal pain.     Protocol recommended 2nd level triage. Writer was able to talk with Dr. Aquino in clinic who agreed with ED disposition.      Writer advised patient that she needs to go to the emergency room now. Patient agreed with disposition and is having  bring her to ED. Writer advised patient to hold xarelto and avoid ibuprofen/ NSAIDS if she starts having pain.   Writer advised patient to drink fluids and make sure to eat snacks such as granola bars etc.     Reason for Disposition    SEVERE vaginal bleeding (e.g., soaking 2 pads or tampons per hour and present 2 or more hours; 1 menstrual cup every 2 hours)    Additional Information    Negative: SEVERE vaginal bleeding (e.g., continuous red blood from vagina, or large blood clots) and very weak (can't stand)    Negative: Passed out (i.e., fainted, collapsed and was not responding)    Negative: Difficult to awaken or acting confused (e.g., disoriented, slurred speech)    Negative: Shock suspected (e.g., cold/pale/clammy skin, too weak to stand, low BP, rapid pulse)    Negative: Sounds like a life-threatening emergency to the triager    Negative: Pregnant > 20 weeks (5 months or more)    Negative: Pregnant < 20 weeks (less than 5 months)    Negative: Postpartum (from 0 to 6 weeks after delivery)    Negative: " "Vaginal discharge is the main symptom and bleeding is slight    Negative: SEVERE abdominal pain (e.g., excruciating)    Negative: SEVERE dizziness (e.g., unable to stand, requires support to walk, feels like passing out now)    Answer Assessment - Initial Assessment Questions  1. AMOUNT: \"Describe the bleeding that you are having.\"     - SPOTTING: spotting, or pinkish / brownish mucous discharge; does not fill panti-liner or pad     - MILD:  less than 1 pad / hour; less than patient's usual menstrual bleeding    - MODERATE: 1-2 pads / hour; 1 menstrual cup every 6 hours; small-medium blood clots (e.g., pea, grape, small coin)    - SEVERE: soaking 2 or more pads/hour for 2 or more hours; 1 menstrual cup every 2 hours; bleeding not contained by pads or continuous red blood from vagina; large blood clots (e.g., golf ball, large coin)       Severe- soaking pad and tamponevery 15 minutes  2. ONSET: \"When did the bleeding begin?\" \"Is it continuing now?\"      Started 5/1, had started xarelto on 4/28. Has been very heavy 30-40 minutes and in last 2 days has increased in bleeding.  3. MENSTRUAL PERIOD: \"When was the last normal menstrual period?\" \"How is this different than your period?\"      Week 3 of March  4. REGULARITY: \"How regular are your periods?\"     Typical very regular  5. ABDOMINAL PAIN: \"Do you have any pain?\" \"How bad is the pain?\"  (e.g., Scale 1-10; mild, moderate, or severe)    - MILD (1-3): doesn't interfere with normal activities, abdomen soft and not tender to touch     - MODERATE (4-7): interferes with normal activities or awakens from sleep, tender to touch     - SEVERE (8-10): excruciating pain, doubled over, unable to do any normal activities       No pain, had some 5/1-5/2 very bad cramps but since resolved  6. PREGNANCY: \"Could you be pregnant?\" \"Are you sexually active?\" \"Did you recently give birth?\"      No change for pregnancy, sexually active, no recent birth  7. BREASTFEEDING: \"Are you " "breastfeeding?\"      No breastfeeding  8. HORMONES: \"Are you taking any hormone medications, prescription or OTC?\" (e.g., birth control pills, estrogen)      Low dose estrogen birth control stopped taking 4/27  9. BLOOD THINNERS: \"Do you take any blood thinners?\" (e.g., Coumadin/warfarin, Pradaxa/dabigatran, aspirin)      Takes xarelto, last does 5/10 630 pm  10. CAUSE: \"What do you think is causing the bleeding?\" (e.g., recent gyn surgery, recent gyn procedure; known bleeding disorder, cervical cancer, polycystic ovarian disease, fibroids)          Xarelto  11. HEMODYNAMIC STATUS: \"Are you weak or feeling lightheaded?\" If so, ask: \"Can you stand and walk normally?\"         Not lightheaded, feels tired/ lethargic. Can still get up and work from home, can think straight.   12. OTHER SYMPTOMS: \"What other symptoms are you having with the bleeding?\" (e.g., passed tissue, vaginal discharge, fever, menstrual-type cramps)  No fevers, has passed big clots, jelly-like half-dollar to golf ball sized.    Protocols used: VAGINAL BLEEDING - [id number]    Zeus Lang RN     [facility]      "

## 2022-05-11 NOTE — DISCHARGE INSTRUCTIONS
Stop Xarelto.  Follow-up in clinic here on 5/18 at 2pm with Dr. Mckeon  --you will need to have serial ultrasounds to monitor the superficial thrombus in the left leg.  Return for increased vaginal bleeding, dizziness or lightheaded, or any other worsening symptoms.

## 2022-05-11 NOTE — ED PROVIDER NOTES
History     Chief Complaint   Patient presents with     Vaginal Bleeding     HPI  Daylin Amaya is a 46 year old female who presents for evaluation of heavy vaginal bleeding.  Patient was diagnosed with a extensive superficial thrombus in her left lower extremity on 4/28 and started on Xarelto.  It was thought she had developed a provoked blood clot related to obesity, sedentary job, and being on oral birth control.  Patient has a history of menorrhagia, which is why she was on the OBC.  She started having vaginal bleeding on 5/1 and has progressively worsened.  Today she is passing golf ball size blood clots. No lightheaded or dizziness. Fatigue. No prior history of DVT or PE.  She has appt with OB/Gyn with plan for hysterectomy.    ULTRASOUND LEFT LOWER EXTREMITY VENOUS DUPLEX  4/28/2022 10:46 AM     HISTORY: Left leg pain.     TECHNIQUE: Imaged deep venous structures of the left lower extremity  include the left common femoral vein, femoral vein, popliteal vein,  and visualized posterior deep calf veins.  Color flow and spectral  Doppler with waveform analysis performed.     COMPARISON: None.     FINDINGS: No DVT is demonstrated. In the area of pain there is  nonocclusive thrombus within the greater saphenous vein in the  proximal thigh. There is occlusive thrombus seen in the mid thigh  extending through the mid calf.                                                                      IMPRESSION:   1. No evidence of DVT in the left lower extremity.  2. Superficial thrombophlebitis with occlusive and nonocclusive  thrombus through the greater saphenous vein from the proximal thigh  through the mid calf.     GAVIN TRAN MD     Allergies:  Allergies   Allergen Reactions     Amoxicillin Hives     In adulthood     Sulfa Drugs Hives     Clindamycin Diarrhea and Rash       Problem List:    Patient Active Problem List    Diagnosis Date Noted     Uterine polyp 06/10/2019     Priority: Medium     24 hour  "contact handout given 2012     Priority: Medium     EMERGENCY CARE PLAN  Presenting Problem Signs and Symptoms Treatment Plan    Questions or conerns during clinic hours    I will call the clinic directly     Questions or conerns outside clinic hours    I will call the 24 hour nurse line at [phone]    Patient needs to schedule an appointment    I will call the 24 hour scheduling team at [phone] or clinic directly    Same day treatment     I will call the clinic first, nurse line if after hours, urgent care and express care if needed                                 History of  section 2012     Priority: Medium     REBECCA III (cervical intraepithelial neoplasia grade III) with severe dysplasia 2005     Priority: Medium     Formatting of this note might be different from the original.  2005 LEEP: REBECCA III, ectocervical Margin positive     ASCCP recommends:  History of CIN2 - CIN3:  Pap and HPV every 3 years for 20 years.     Plan: Pap/HPV due 2023          Past Medical History:    No past medical history on file.    Past Surgical History:    No past surgical history on file.    Family History:    Family History   Problem Relation Age of Onset     Thyroid Disease Mother      Cancer Father      Thyroid Disease Sister      Allergies Son         food allergies, sees Dr Ambrocio       Social History:  Marital Status:   [2]  Social History     Tobacco Use     Smoking status: Never Smoker     Smokeless tobacco: Never Used   Substance Use Topics     Alcohol use: No     Drug use: No        Medications:    loratadine (CLARITIN) 10 MG tablet  rivaroxaban ANTICOAGULANT (XARELTO) 15 MG TABS tablet  rivaroxaban ANTICOAGULANT (XARELTO) 20 MG TABS tablet          Review of Systems  As mentioned above in the history present illness. All other systems were reviewed and are negative.    Physical Exam   BP: (!) 151/92  Pulse: 99  Temp: 97.5  F (36.4  C)  Resp: 16  Height: 170.2 cm (5' 7\")  Weight: " 115.7 kg (255 lb)  SpO2: 100 %      Physical Exam  Constitutional:       General: She is not in acute distress.     Appearance: Normal appearance. She is well-developed. She is not ill-appearing.   HENT:      Head: Normocephalic and atraumatic.      Right Ear: External ear normal.      Left Ear: External ear normal.      Nose: Nose normal.      Mouth/Throat:      Mouth: Mucous membranes are moist.   Eyes:      Conjunctiva/sclera: Conjunctivae normal.   Cardiovascular:      Rate and Rhythm: Normal rate and regular rhythm.      Heart sounds: Normal heart sounds. No murmur heard.  Pulmonary:      Effort: Pulmonary effort is normal. No respiratory distress.      Breath sounds: Normal breath sounds.   Abdominal:      General: Bowel sounds are normal. There is no distension.      Palpations: Abdomen is soft.      Tenderness: There is no abdominal tenderness.   Musculoskeletal:         General: Normal range of motion.   Skin:     General: Skin is warm and dry.      Findings: No rash.   Neurological:      General: No focal deficit present.      Mental Status: She is alert and oriented to person, place, and time.         ED Course                 Procedures              Results for orders placed or performed during the hospital encounter of 05/11/22 (from the past 24 hour(s))   [place] Draw    Narrative    The following orders were created for panel order [place] Draw.  Procedure                               Abnormality         Status                     ---------                               -----------         ------                     Extra Blue Top Tube[[id number]]                              Final result               Extra Red Top Tube[[id number]]                               Final result               Extra Green Top (Lithium...[[id number]]                      Final result               Extra Purple Top Tube[[id number]]                            Final result                 Please view results for these tests on  the individual orders.   Extra Blue Top Tube   Result Value Ref Range    Hold Specimen JIC    Extra Red Top Tube   Result Value Ref Range    Hold Specimen JIC    Extra Green Top (Lithium Heparin) Tube   Result Value Ref Range    Hold Specimen JIC    Extra Purple Top Tube   Result Value Ref Range    Hold Specimen JIC    CBC with platelets, differential    Narrative    The following orders were created for panel order CBC with platelets, differential.  Procedure                               Abnormality         Status                     ---------                               -----------         ------                     CBC with platelets and d...[[id number]]  Abnormal            Final result                 Please view results for these tests on the individual orders.   Comprehensive metabolic panel   Result Value Ref Range    Sodium 141 133 - 144 mmol/L    Potassium 3.7 3.4 - 5.3 mmol/L    Chloride 110 (H) 94 - 109 mmol/L    Carbon Dioxide (CO2) 27 20 - 32 mmol/L    Anion Gap 4 3 - 14 mmol/L    Urea Nitrogen 12 7 - 30 mg/dL    Creatinine 0.78 0.52 - 1.04 mg/dL    Calcium 8.6 8.5 - 10.1 mg/dL    Glucose 106 (H) 70 - 99 mg/dL    Alkaline Phosphatase 61 40 - 150 U/L    AST 17 0 - 45 U/L    ALT 31 0 - 50 U/L    Protein Total 6.3 (L) 6.8 - 8.8 g/dL    Albumin 3.2 (L) 3.4 - 5.0 g/dL    Bilirubin Total 0.2 0.2 - 1.3 mg/dL    GFR Estimate >90 >60 mL/min/1.73m2   CBC with platelets and differential   Result Value Ref Range    WBC Count 8.3 4.0 - 11.0 10e3/uL    RBC Count 3.92 3.80 - 5.20 10e6/uL    Hemoglobin 11.1 (L) 11.7 - 15.7 g/dL    Hematocrit 34.7 (L) 35.0 - 47.0 %    MCV 89 78 - 100 fL    MCH 28.3 26.5 - 33.0 pg    MCHC 32.0 31.5 - 36.5 g/dL    RDW 13.9 10.0 - 15.0 %    Platelet Count 82 (L) 150 - 450 10e3/uL    % Neutrophils 48 %    % Lymphocytes 42 %    % Monocytes 6 %    % Eosinophils 2 %    % Basophils 1 %    % Immature Granulocytes 1 %    NRBCs per 100 WBC 0 <1 /100    Absolute Neutrophils 4.0 1.6 - 8.3 10e3/uL     Absolute Lymphocytes 3.5 0.8 - 5.3 10e3/uL    Absolute Monocytes 0.5 0.0 - 1.3 10e3/uL    Absolute Eosinophils 0.1 0.0 - 0.7 10e3/uL    Absolute Basophils 0.0 0.0 - 0.2 10e3/uL    Absolute Immature Granulocytes 0.1 <=0.4 10e3/uL    Absolute NRBCs 0.0 10e3/uL   US Lower Extremity Venous Duplex Left    Narrative    VENOUS ULTRASOUND LEFT LEG  5/11/2022 3:37 PM     HISTORY: Swelling of the left lower extremity.    COMPARISON: Ultrasound dated 4/28/2022    FINDINGS:  Examination of the deep veins with graded compression and  color flow Doppler with spectral wave form analysis was performed.   There is no evidence for DVT in the left lower extremity.    Again identified is superficial venous thrombus in the left greater  saphenous vein extending from the mid thigh to the mid calf. This is  partially occlusive to nonocclusive.      Impression    IMPRESSION: No evidence of deep venous thrombosis.  No significant  change in superficial venous thrombus in the left greater saphenous  vein as above.    GINA MOSQUERA MD         SYSTEM ID:  [id number]       Medications   0.9% sodium chloride BOLUS (0 mLs Intravenous Stopped 5/11/22 1729)     Followed by   sodium chloride 0.9% infusion (has no administration in time range)       Assessments & Plan (with Medical Decision Making)     46 year old female with history of menorrhagia and previously been on oral birth control.  She was seen in clinic on 4/28 for left calf pain and had an ultrasound showing a extensive superficial blood clot in the saphenous vein, but no DVT.  (See ultrasound report as noted above).  Because of the extensive nature she was started on Xarelto.  Patient started vaginal bleeding on 5/1 and this is progressively gotten worse.  Today she was passing golf ball size clots.  On exam she is alert and oriented.  Normotensive.  No tachycardia.  Lungs are CTA.  No hypoxia.  No lower extremity edema.  Notable varicosities to her left leg.  Repeat ultrasound was  obtained and shows no DVT.  She has no significant change in the superficial venous thrombus in the left greater saphenous vein.  Hemoglobin is 11.1.  I discussed the lab and imaging findings with patient.  We did page out for hematology twice but did not get a call back.     Since her ultrasound reveals no worsening of her superficial thrombus and no DVT, I recommend we stop the Xarelto.  She will need serial ultrasounds to ensure that her superficial thrombus does not worsen or develop into a DVT.  We set up a recheck appointment for her for next week.  Regarding her vaginal bleeding the hope is that it will soon slow down and stop now that she is not on the Xarelto.  She does have appointment to see OB/GYN with plans for hysterectomy.      Plan:  Stop Xarelto.  Follow-up in clinic here on 5/18 at 2pm with Dr. Mckeon  --you will need to have serial ultrasounds to monitor the superficial thrombus in the left leg.  Return for increased vaginal bleeding, dizziness or lightheaded, or any other worsening symptoms.    Discharge Medication List as of 5/11/2022  5:29 PM          Final diagnoses:   Vaginal bleeding       5/11/2022   [facility] EMERGENCY DEPT     Faisal, Estrella Patel, TIFFANY CNP  05/11/22 9695

## 2022-05-11 NOTE — TELEPHONE ENCOUNTER
See triage encounter from 5/11. RN triaged and advised that patient go to the emergency room now.    Zeus Lang RN     [facility]

## 2022-05-11 NOTE — ED TRIAGE NOTES
Pt was placed on Xarelto for a superficial blood clot likely from OBC, due to size pt was placed on thinner.  Pt states now she now has heavy vaginal  Bleeding.  Advised to come to ED.  States she is changing pads/tampons every 15-30 min.  Held Xarelto today.     Triage Assessment     Row Name 05/11/22 1217       Triage Assessment (Adult)    Airway WDL WDL       Respiratory WDL    Respiratory WDL WDL       Skin Circulation/Temperature WDL    Skin Circulation/Temperature WDL WDL       Cardiac WDL    Cardiac WDL WDL       Peripheral/Neurovascular WDL    Peripheral Neurovascular WDL WDL       Cognitive/Neuro/Behavioral WDL    Cognitive/Neuro/Behavioral WDL WDL

## 2022-05-18 ENCOUNTER — HOSPITAL ENCOUNTER (OUTPATIENT)
Dept: ULTRASOUND IMAGING | Facility: CLINIC | Age: 47
Discharge: HOME | End: 2022-05-18
Attending: FAMILY MEDICINE | Admitting: FAMILY MEDICINE
Payer: COMMERCIAL

## 2022-05-18 ENCOUNTER — OFFICE VISIT (OUTPATIENT)
Dept: FAMILY MEDICINE | Facility: CLINIC | Age: 47
End: 2022-05-18
Payer: COMMERCIAL

## 2022-05-18 VITALS
HEIGHT: 67 IN | WEIGHT: 260.6 LBS | OXYGEN SATURATION: 98 % | TEMPERATURE: 98.6 F | BODY MASS INDEX: 40.9 KG/M2 | SYSTOLIC BLOOD PRESSURE: 126 MMHG | DIASTOLIC BLOOD PRESSURE: 80 MMHG | RESPIRATION RATE: 16 BRPM | HEART RATE: 89 BPM

## 2022-05-18 DIAGNOSIS — I80.02 SUPERFICIAL THROMBOPHLEBITIS OF LEFT LEG: Primary | ICD-10-CM

## 2022-05-18 PROCEDURE — 99214 OFFICE O/P EST MOD 30 MIN: CPT | Performed by: FAMILY MEDICINE

## 2022-05-18 PROCEDURE — 93971 EXTREMITY STUDY: CPT | Mod: LT

## 2022-05-18 RX ORDER — ASPIRIN 325 MG
325 TABLET, DELAYED RELEASE (ENTERIC COATED) ORAL DAILY
Qty: 90 TABLET | Refills: 3 | Status: SHIPPED | OUTPATIENT
Start: 2022-05-18 | End: 2023-02-22

## 2022-05-18 ASSESSMENT — PAIN SCALES - GENERAL: PAINLEVEL: NO PAIN (0)

## 2022-05-18 NOTE — PATIENT INSTRUCTIONS
Ultrasound has been ordered to look at the clot again.   If clot still present but is decreasing, consider aspirin 325 mg daily.  If clot is expanding, will consider restarting another form of blood thinner, and possibly refer to the vein clinic.    Activity as tolerated.  Avoid high impact activity for now.  Elevate legs when resting.    Read the literature here about superficial thrombophlebitis.

## 2022-05-18 NOTE — PROGRESS NOTES
Assessment & Plan     Superficial thrombophlebitis of left leg  Improved pain per patient. No acute leg today.  Patient is now off OCP. Still off anticoagulant due to iatrogenic bleeding with it.  If still significant saphenous vein clot, consider Aspirin 325 mg daily, but may reconsider Eliquis if clot has extended.  Return precautions discussed and given to patient.   - US Lower Extremity Venous Duplex Left    Patient Instructions   Ultrasound has been ordered to look at the clot again.   If clot still present but is decreasing, consider aspirin 325 mg daily.  If clot is expanding, will consider restarting another form of blood thinner, and possibly refer to the vein clinic.    Activity as tolerated.  Avoid high impact activity for now.  Elevate legs when resting.    Read the literature here about superficial thrombophlebitis.      Return in about 1 week (around 5/25/2022) for In-clinic visit for if with new or worsening symptom.    Norberto Mckeon MD  [facility] DANNY Mao is a 46 year old who presents for the following health issues     HPI     ED/UC Followup:    Facility:  [facility]  Date of visit: 5/11/22  Reason for visit: Vaginal bleeding due to xarelto, superficial blood clot, left lower leg  Current Status: Xarelto was stopped.  Bleeding stopped after 48 hours.  Was advised to follow up to monitor blood clot     Patient said left leg pain is now minimal twinge.   No more vag bleeding since 4 days ago.  Denies any other new symptoms today.    Meeting with GYN at end of the month.  Has had menorrhagia, hence predisposed to heavy bleeding.      Off of OCP since 4/28/2022.    Patient denies being a current tobacco user.    Review of Systems   Constitutional, HEENT, cardiovascular, pulmonary, GI, , musculoskeletal, neuro, skin, endocrine and psych systems are negative, except as otherwise noted.      Objective    /80 (BP Location: Left arm, Patient  "Position: Chair, Cuff Size: Adult Large)   Pulse 89   Temp 98.6  F (37  C) (Tympanic)   Resp 16   Ht 1.702 m (5' 7\")   Wt 118.2 kg (260 lb 9.6 oz)   SpO2 98%   BMI 40.82 kg/m    Body mass index is 40.82 kg/m .  Physical Exam   GENERAL: healthy, alert and no distress, ambulatory w/o assist  NECK: no tenderness, no adenopathy,  Thyroid not enlarged  RESP: lungs clear to auscultation - no rales, no rhonchi, no wheezes  CV: regular rates and rhythm, no murmur  MS: no edema  SKIN: no suspicious lesions, no rashes  NEURO: strength and tone- normal, sensory exam- grossly normal, mentation- intact, speech- normal, reflexes- symmetric  ABD:  nontender    Admission on 05/11/2022, Discharged on 05/11/2022   Component Date Value Ref Range Status     Hold Specimen 05/11/2022 [facility]   Final     Hold Specimen 05/11/2022 [facility]   Final     Hold Specimen 05/11/2022 [facility]   Final     Hold Specimen 05/11/2022 [facility]   Final     Sodium 05/11/2022 141  133 - 144 mmol/L Final     Potassium 05/11/2022 3.7  3.4 - 5.3 mmol/L Final     Chloride 05/11/2022 110 (A) 94 - 109 mmol/L Final     Carbon Dioxide (CO2) 05/11/2022 27  20 - 32 mmol/L Final     Anion Gap 05/11/2022 4  3 - 14 mmol/L Final     Urea Nitrogen 05/11/2022 12  7 - 30 mg/dL Final     Creatinine 05/11/2022 0.78  0.52 - 1.04 mg/dL Final     Calcium 05/11/2022 8.6  8.5 - 10.1 mg/dL Final     Glucose 05/11/2022 106 (A) 70 - 99 mg/dL Final     Alkaline Phosphatase 05/11/2022 61  40 - 150 U/L Final     AST 05/11/2022 17  0 - 45 U/L Final     ALT 05/11/2022 31  0 - 50 U/L Final     Protein Total 05/11/2022 6.3 (A) 6.8 - 8.8 g/dL Final     Albumin 05/11/2022 3.2 (A) 3.4 - 5.0 g/dL Final     Bilirubin Total 05/11/2022 0.2  0.2 - 1.3 mg/dL Final     GFR Estimate 05/11/2022 >90  >60 mL/min/1.73m2 Final    Effective December 21, 2021 eGFRcr in adults is calculated using the 2021 CKD-EPI creatinine equation which includes age and gender (Abel grimaldo al., NEJM, DOI: 10.1056/ICTFjh2426467)     WBC " Count 05/11/2022 8.3  4.0 - 11.0 10e3/uL Final     RBC Count 05/11/2022 3.92  3.80 - 5.20 10e6/uL Final     Hemoglobin 05/11/2022 11.1 (A) 11.7 - 15.7 g/dL Final     Hematocrit 05/11/2022 34.7 (A) 35.0 - 47.0 % Final     MCV 05/11/2022 89  78 - 100 fL Final     MCH 05/11/2022 28.3  26.5 - 33.0 pg Final     MCHC 05/11/2022 32.0  31.5 - 36.5 g/dL Final     RDW 05/11/2022 13.9  10.0 - 15.0 % Final     Platelet Count 05/11/2022 82 (A) 150 - 450 10e3/uL Final     % Neutrophils 05/11/2022 48  % Final     % Lymphocytes 05/11/2022 42  % Final     % Monocytes 05/11/2022 6  % Final     % Eosinophils 05/11/2022 2  % Final     % Basophils 05/11/2022 1  % Final     % Immature Granulocytes 05/11/2022 1  % Final     NRBCs per 100 WBC 05/11/2022 0  <1 /100 Final     Absolute Neutrophils 05/11/2022 4.0  1.6 - 8.3 10e3/uL Final     Absolute Lymphocytes 05/11/2022 3.5  0.8 - 5.3 10e3/uL Final     Absolute Monocytes 05/11/2022 0.5  0.0 - 1.3 10e3/uL Final     Absolute Eosinophils 05/11/2022 0.1  0.0 - 0.7 10e3/uL Final     Absolute Basophils 05/11/2022 0.0  0.0 - 0.2 10e3/uL Final     Absolute Immature Granulocytes 05/11/2022 0.1  <=0.4 10e3/uL Final     Absolute NRBCs 05/11/2022 0.0  10e3/uL Final

## 2022-05-23 DIAGNOSIS — I80.02 SUPERFICIAL THROMBOPHLEBITIS OF LEFT LEG: Primary | ICD-10-CM

## 2022-06-13 ENCOUNTER — OFFICE VISIT (OUTPATIENT)
Dept: VASCULAR SURGERY | Facility: CLINIC | Age: 47
End: 2022-06-13
Attending: FAMILY MEDICINE
Payer: COMMERCIAL

## 2022-06-13 VITALS
HEART RATE: 88 BPM | SYSTOLIC BLOOD PRESSURE: 132 MMHG | WEIGHT: 255 LBS | DIASTOLIC BLOOD PRESSURE: 85 MMHG | TEMPERATURE: 98.9 F | HEIGHT: 67 IN | RESPIRATION RATE: 12 BRPM | BODY MASS INDEX: 40.02 KG/M2

## 2022-06-13 DIAGNOSIS — I80.02 SUPERFICIAL THROMBOPHLEBITIS OF LEFT LEG: ICD-10-CM

## 2022-06-13 PROCEDURE — G0463 HOSPITAL OUTPT CLINIC VISIT: HCPCS

## 2022-06-13 PROCEDURE — 99203 OFFICE O/P NEW LOW 30 MIN: CPT | Performed by: SURGERY

## 2022-06-13 NOTE — PROGRESS NOTES
VASCULAR SURGERY VEIN CLINIC CONSULTATION   VASCULAR SURGEON: Robert Paulino MD, RPVI     LOCATION:  [facility]     Daylin Amaya  Medical Record #:  [id number]  YOB: 1975  Age:  46 year old     Date of Service: 6/13/2022    PRIMARY CARE PROVIDER: Ysabel Zambrano (Inactive)    Assessment:     Left lower extremity edema associated with pain.  Patient was recently diagnosed with left GSV thrombophlebitis.    Plan:     1. Treatment options of conservative therapy of stockings use, exercise, weight loss, elevating legs when possible.    2. Script for compression stockings 20-30 mmHg  3. Ultrasound to evaluate legs for incompetency of both deep and superficial system .   4. Will Schedule Venous Competency Study   5. Follow up: 3 months.   6. NSAID'S for pain control as needed  7.  Will order CT abdomen pelvis to rule out May Nunez's syndrome      Subjective:      Daylin Amaya is a 46 year old female  who was referred by Ysabel Zambrano (Inactive)  for evaluation of varicose veins. Symptoms include symptomatic    left calf Patient has history of leg swelling, pain and vein issues that have progressed. Pain and symptoms have affected daily living and work activities needing medications. Pt is here for evaluation today. no stocking or compression devic use    Allergies:Amoxicillin, Sulfa drugs, and Clindamycin    No past medical history on file.    No past surgical history on file.    [unfilled]    Family History   Problem Relation Age of Onset     Thyroid Disease Mother      Cancer Father      Thyroid Disease Sister      Allergies Son         food allergies, sees Dr Ambrocio        reports that she has never smoked. She has never used smokeless tobacco. She reports that she does not drink alcohol and does not use drugs.      Review of Systems:  Pertinent items are noted in HPI.   Patient has symptomatic veins and changes of left legs. These have  "progressed to the point of causing symptoms on a daily basis. This causes issues with daily activities and chores such as washing dishes, vacuuming, mowing lawn, outdoor upkeep and standing for long lengths of time       Objective:     Vitals:    06/13/22 1058   BP: 132/85   Pulse: 88   Resp: 12   Temp: 98.9  F (37.2  C)   TempSrc: Oral   Weight: 115.7 kg (255 lb)   Height: 1.702 m (5' 7\")     Body mass index is 39.94 kg/m .    EXAM:  GENERAL: This is a well-developed 46 year old female who appears her stated age  HEAD: normocephalic  HEENT: Pupils equal and reactive bilaterally  MOUTH: mucus membranes intact. Normal dentation  CARDIAC: RRR without murmur  CHEST/LUNG:  Clear to auscultation bilaterally  ABDOMEN: Soft, nontender, nondistended, no masses noted   NEUROLOGIC: Focally intact, nonfocal, alert and oriented x 3  INTEGUMENT: No open lesions or ulcers  VASCULAR: Pulses intact, symmetrical upper and lower extremities. There are noskin changes consistent with chronic venous insufficiency. Varicose veins present in left greater saphenous distribution.       Side:: Left  VCSS  PAIN:: Mild: Occasional, not restricting activity of requiring pain medication  Varicose Veins:: Mild: Few scattered  Venous Edema:: Mild: Evening ankle swelling only  Skin Pigmentation:: Absent: None  Inflamation:: Absent: None  Induration:: Absent: None  Number of active ulcers:: 0  Active ulcer duration:: None  Active ulcer diameter:: None  Compression Therapy:: Not used or patient not compliant  VCSS Score:: 3  CEAP:: Ankle edema of venous origin (not foot edema)    Imaging:    Robert Paulino MD   [facility] Surgery  [phone]  Fax: [phone]              "

## 2022-06-13 NOTE — PROGRESS NOTES
"[facility]      Patient is here for a consult to discuss thrombophlebitis of left GSV 4/28/22. Dr. Mckeon referring. The patient has varicose veins that are problematic in left legs. Symptoms patient has been experiencing are discomfort and swelling. Patient has not been wearing compression stockings. Patient has not been using pain medication or antiinflammatory's. Patient  has had recent imaging on legs done.    Pt is currently taking Aspirin 325 mg once daily. Was on Xarelto but dc'd d/t vaginal bleeding.     /85   Pulse 88   Temp 98.9  F (37.2  C) (Oral)   Resp 12   Ht 5' 7\" (1.702 m)   Wt 255 lb (115.7 kg)   BMI 39.94 kg/m      The provider has been notified that the patient has concerns of treatment and her risks of DVT. Patient has new sensation of warmth in right leg that occurs daily, started about 4 days ago.       Questions patient would like addressed today are: N/A.    Refills are needed: N/A    Has homecare services and agency name:  Li NAPOLES RN                             "

## 2022-06-13 NOTE — PATIENT INSTRUCTIONS
Computed Tomography Angiography (CTA)    Computed tomography angiography (CTA) is an imaging test. It uses X-rays and computer technology to make detailed pictures of your arteries. Before the test, an X-ray dye (contrast medium) is injected into your vein. The dye makes it easier to see your blood vessels on the X-ray. Pictures are then taken with the CT scanner. A computer turns the images into 2-D and 3-D pictures.      Computed tomography angiogram of carotid arteries.  CTA can make 3D images, such as the carotid arteries shown here.    Why CTA is done  CTA may be used to:    Check arteries in your belly, neck, lungs, pelvis, kidneys, or brain.  Look for a ballooning of the blood vessel wall (aneurysm) or a tear (dissection).  Check if a tube (stent) used to keep an artery open is working well.  Find damage to your arteries due to injuries.  Collect details on blood vessels that supply blood to tumors.    Getting ready for your test  Tell your healthcare provider if you:    Have diabetes  Have kidney disease  Are allergic to X-ray dye or other medicines  Are pregnant or think you may be pregnant  Are taking any medicines, herbs, or supplements, including prescription medicines, illegal drugs, and over-the-counter medicines such as aspirin or ibuprofen    Follow any directions you are given for not eating or drinking before the CTA. Follow any other instructions from your healthcare provider.      During your test  You will be asked to remove any hair clips, jewelry, false teeth, or other metal items that could show up on the X-ray.  You will lie down on the scanning table. An IV line will be put in a vein in your arm or hand.  The scanning table will be properly placed. The part of your body being checked will be inside the doughnut-shaped CT scanner.  One image may be taken first to be sure you are in the proper position for the test.  The IV will be hooked up to an automatic injection machine. This controls  how often and how fast the X-ray dye is injected. The injection may continue during part of the exam.  The dye will be put into your vein through the IV line. You may feel warmth through your body when the dye is injected.  You can t move while the X-rays are being taken. Pillows and foam pads may be used to help you stay still. You will be told to hold your breath for 10 to 25 seconds at a time.  A single scan may take several minutes. You may need more than one scan.    After your test  Drink plenty of fluids to help flush the X-ray dye from your body.  You may eat as soon as you want to.    Possible risks  All procedures have some risks. A CTA has some possible risks. These include:  Problems due to the X-ray dye, such as an allergic reaction or kidney damage  Skin damage from leaking X-ray dye near where the IV was put in      [id number] [employer]. [address] [place] [address]. All rights reserved. This information is not intended as a substitute for professional medical care. Always follow your healthcare professional's instructions.

## 2022-06-27 ENCOUNTER — HOSPITAL ENCOUNTER (OUTPATIENT)
Dept: CT IMAGING | Facility: CLINIC | Age: 47
Discharge: HOME | End: 2022-06-27
Attending: SURGERY | Admitting: SURGERY
Payer: COMMERCIAL

## 2022-06-27 DIAGNOSIS — I80.02 SUPERFICIAL THROMBOPHLEBITIS OF LEFT LEG: ICD-10-CM

## 2022-06-27 PROCEDURE — 250N000011 HC RX IP 250 OP 636: Performed by: RADIOLOGY

## 2022-06-27 PROCEDURE — 74174 CTA ABD&PLVS W/CONTRAST: CPT

## 2022-06-27 PROCEDURE — 250N000009 HC RX 250: Performed by: RADIOLOGY

## 2022-06-27 RX ORDER — IOPAMIDOL 755 MG/ML
75 INJECTION, SOLUTION INTRAVASCULAR ONCE
Status: COMPLETED | OUTPATIENT
Start: 2022-06-27 | End: 2022-06-27

## 2022-06-27 RX ADMIN — IOPAMIDOL 75 ML: 755 INJECTION, SOLUTION INTRAVENOUS at 13:52

## 2022-06-27 RX ADMIN — SODIUM CHLORIDE 100 ML: 9 INJECTION, SOLUTION INTRAVENOUS at 13:53

## 2022-08-30 NOTE — PROGRESS NOTES
46-year-old female who is scheduled for billable telephone visit.  Patient has known venous insufficiency pronounced most on the left side.  Most recent ultrasound did show incompetency of the great saphenous vein and accessory great saphenous vein.  Patient states that her symptoms have improved.  Patient is wearing compression stockings and would like to continue to do so.  Patient will need as needed follow-up in vascular surgery clinic        Video-Visit Details    Video Start Time: 12:20 PM    Type of service:  Video Visit    Video End Time:12:40 PM    Originating Location (pt. Location): Home    Distant Location (provider location):  [facility] [place]     Platform used for Video Visit: ISVS    .  ..

## 2022-08-30 NOTE — PROGRESS NOTES
Patient is in clinic today to see MD Edenilson Paulino.      Patient is here for a follow up to discuss Varicose Veins.    The patient does not smoke.    Pt is currently taking ASA.    The patient states he/she are NOT wearing compression .    Swelling is observed in left leg.    Pt rates pain 0/10 located at NA.    Pts symptoms include none in Left extremity. Occ swelling    Patients condition is stable.    The provider has been notified that the patient has no complaints.

## 2022-09-06 ENCOUNTER — VIRTUAL VISIT (OUTPATIENT)
Dept: VASCULAR SURGERY | Facility: CLINIC | Age: 47
End: 2022-09-06
Payer: COMMERCIAL

## 2022-09-06 ENCOUNTER — ANCILLARY PROCEDURE (OUTPATIENT)
Dept: VASCULAR ULTRASOUND | Facility: CLINIC | Age: 47
End: 2022-09-06
Attending: SURGERY
Payer: COMMERCIAL

## 2022-09-06 DIAGNOSIS — I80.02 SUPERFICIAL THROMBOPHLEBITIS OF LEFT LEG: ICD-10-CM

## 2022-09-06 DIAGNOSIS — I80.02 SUPERFICIAL THROMBOPHLEBITIS OF LEFT LEG: Primary | ICD-10-CM

## 2022-09-06 PROCEDURE — 93970 EXTREMITY STUDY: CPT

## 2022-09-06 PROCEDURE — 99213 OFFICE O/P EST LOW 20 MIN: CPT | Mod: GT | Performed by: SURGERY

## 2022-09-06 NOTE — NURSING NOTE
Chief Complaint   Patient presents with     Follow Up     Us done       Patient confirms medications and allergies are accurate via patients echeck in completion, and or denies any changes since last reviewed/verified.       Criss Valverde, Virtual Facilitator

## 2022-09-06 NOTE — PATIENT INSTRUCTIONS
Jose Manuel Mao,    Thank you for entrusting your care with us today. After your visit today with MD Robert Paulino this is the plan that was discussed at your appointment.    We are recommending you continue the compression stockings    If you would like to proceed with the procedure prior to our follow up let us know. I am including information about it.    Follow up with us in 6 months with another video visit. - this is tentatively scheduled 3/7/2023 at 11:40    I am including additional information on these things and our contact information if you have any questions or concerns.   Please do not hesitate to reach out to us if you felt we did not answer your questions or you are unsure of the treatment plan after your visit today. Our number is [phone].Thank you for trusting us with your care.         Again thank you for your time.     Angela Hanson RN      Radiofrequency Ablation (RFA) Treatment for Varicose Veins  Radiofrequency ablation (RFA) is a procedure to treat varicose veins. It uses heat created from radiofrequency (RF).  Varicose veins are swollen, enlarged veins. They happen most often in the legs. Varicose veins can develop when valves in your veins become damaged. This causes problems with blood flow. Over time, too much blood collects in your veins. The veins may bulge, twist, and stand out under your skin. They can also cause symptoms such as aching, cramping, or swelling in your legs.  During RFA treatment, RF heat is sent into your vein through a thin, flexible tube (catheter). This closes off blood flow in the main problem vein.    With RFA treatment, a catheter that contains RF heat is used to seal off the main problem vein.  Getting ready for your treatment  Follow any instructions from your healthcare provider.  Tell your provider if you:  Are pregnant or think you may be pregnant  Are breastfeeding  Have any allergies or intolerances to certain medicines. Explain what reaction you have  had to these medicines in the past.  Any sensitives to glues or adhesives  Tell your provider about any medicines you are taking. You may need to stop taking all or some of these before the test. This includes:  Medicines that can thin your blood or prevent clotting (anticoagulants)  All prescription medicines  Over-the-counter medicines such as aspirin or ibuprofen  Street drugs  Herbs, vitamins, and other supplements  Follow any directions you re given for not eating or drinking before the procedure.  The day of your treatment  The treatment takes 45 to 60 minutes. The entire treatment (including time to prepare and recover) takes about 1 to 3 hours. You can go home the same day. For the treatment:   You ll lie down on a hospital bed.  An imaging method, such as ultrasound, is used to guide the procedure.  The leg to be treated is injected with numbing medicine.  Once your leg is numb, a needle makes a small hole (puncture) in the vein to be treated.  The catheter with the RF heat source is inserted into your vein.  More numbing medicine may be injected around your vein.  Once the catheter is in the right position, it is then slowly drawn backward. As the catheter sends out heat, the vein is closed off.  In some cases, other side branch varicose veins may be removed or tied off through a few small cuts (incisions).  When the treatment is done, the catheter is removed. Pressure is applied to the insertion site to stop any bleeding. An elastic compression stocking or a bandage may then be put on your leg.  Recovering at home  Once at home, follow all the instructions you ve been given. Be sure to:  Take all medicines as directed  Care for the catheter insertion site as directed  Check for signs of infection at the catheter insertion site (see below)  Wear compression stockings or bandages as directed  Keep your legs raised (elevated) as directed  Walk a few times a day  Avoid heavy exercise, lifting, and standing  for long periods for 2 weeks after the procedure  Avoid air travel for 3 weeks  Avoid hot baths, saunas, or whirlpools until incision site is closed      Call your healthcare provider  Call your healthcare provider if you have any of the following:  Fever of 100.4 F (38 C) or higher, or as directed by your provider  Chest pain or trouble breathing  Signs of infection at the catheter insertion site. These include increased redness or swelling (inflammation), warmth, increasing pain, bleeding, or bad-smelling discharge.  Severe numbness or tingling in the treated leg  Severe pain or swelling in the treated leg     Follow-up  You ll have a follow-up visit with your healthcare provider in 6 weeks after your procedure. An ultrasound will be done 2-3 days after your procedure to check for problems, such as blood clots. Your provider will discuss further treatments with you, if needed.  Risks and possible complications   These include the following:  Bleeding  Infection  Blood clots  Damage to the nerves in the treated area  Irritation or burning of the skin over the treated vein  Treatment doesn't improve the look or the symptoms of the problem veins  Risks of any medicines used during the treatment      Please contact our office with concerns or questions    [facility]  [phone]      Date Last Reviewed: 5/1/2016 2000-2016 [employer]. [address] [place] [address], [place] [address]. All rights reserved. This information is not intended as a substitute for professional medical care. Always follow your healthcare professional's instructions.

## 2022-11-09 ENCOUNTER — HOSPITAL ENCOUNTER (EMERGENCY)
Facility: CLINIC | Age: 47
Discharge: HOME | End: 2022-11-09
Attending: PHYSICIAN ASSISTANT | Admitting: PHYSICIAN ASSISTANT
Payer: COMMERCIAL

## 2022-11-09 VITALS
HEART RATE: 78 BPM | HEIGHT: 67 IN | WEIGHT: 260 LBS | SYSTOLIC BLOOD PRESSURE: 132 MMHG | BODY MASS INDEX: 40.81 KG/M2 | TEMPERATURE: 97.9 F | DIASTOLIC BLOOD PRESSURE: 82 MMHG | OXYGEN SATURATION: 99 %

## 2022-11-09 DIAGNOSIS — Z29.9 PROPHYLACTIC MEASURE: ICD-10-CM

## 2022-11-09 PROCEDURE — 99282 EMERGENCY DEPT VISIT SF MDM: CPT | Performed by: PHYSICIAN ASSISTANT

## 2022-11-09 ASSESSMENT — ENCOUNTER SYMPTOMS
CONSTITUTIONAL NEGATIVE: 1
MUSCULOSKELETAL NEGATIVE: 1

## 2022-11-09 ASSESSMENT — ACTIVITIES OF DAILY LIVING (ADL): ADLS_ACUITY_SCORE: 35

## 2022-11-09 NOTE — ED NOTES
Patient's rabbit was attacked by a minx a couple of days ago and patient was holding rabbit after.  Rabbit was not vaccinated for rabies.  Rabbit received rabies vaccine and antibiotics yesterday.

## 2022-11-09 NOTE — ED PROVIDER NOTES
History     Chief Complaint   Patient presents with     poss exposure to rabies     HPI  Daylin Amaya is a 47 year old female who presents with parent for evaluation of possible rabies exposure.  Patient reports that her daughter's rabbit was attacked by a mink while in its cage outside 2 days ago.  The rabbits sustained puncture wounds about the head.  Patient states that she quickly swept up the rabbit and put it in a towel and cleaned the wounds and brought it into the vet.  Patient may have potentially gotten some of the blood on her hands.  Patient is also concerned about potential mink saliva exposure.  Their rabbit was vaccinated against rabies yesterday and is home with instructions from vet to continue quarantining the rabbit for upwards of 4 weeks.  Patient did not sustain any bites or puncture wounds and did not come into direct contact with the mink.  Patient is concerned about possible rabies exposure in case she may have gotten the mink's saliva on her hands while handling her rabbit afterwards and could have potentially inadvertently touched her eyes at some point.  Patient therefore wonders if they should receive rabies postexposure prophylaxis.  Immunizations including tetanus are up-to-date.        Allergies:  Allergies   Allergen Reactions     Amoxicillin Hives     In adulthood     Sulfa Drugs Hives     Clindamycin Diarrhea and Rash       Problem List:    Patient Active Problem List    Diagnosis Date Noted     Uterine polyp 06/10/2019     Priority: Medium     24 hour contact handout given 08/09/2012     Priority: Medium     EMERGENCY CARE PLAN  Presenting Problem Signs and Symptoms Treatment Plan    Questions or conerns during clinic hours    I will call the clinic directly     Questions or conerns outside clinic hours    I will call the 24 hour nurse line at [phone]    Patient needs to schedule an appointment    I will call the 24 hour scheduling team at [phone] or clinic directly  "   Same day treatment     I will call the clinic first, nurse line if after hours, urgent care and express care if needed                                 History of  section 2012     Priority: Medium     REBECCA III (cervical intraepithelial neoplasia grade III) with severe dysplasia 2005     Priority: Medium     Formatting of this note might be different from the original.  2005 LEEP: REBECCA III, ectocervical Margin positive     ASCCP recommends:  History of CIN2 - CIN3:  Pap and HPV every 3 years for 20 years.     Plan: Pap/HPV due 2023          Past Medical History:    No past medical history on file.    Past Surgical History:    No past surgical history on file.    Family History:    Family History   Problem Relation Age of Onset     Thyroid Disease Mother      Cancer Father      Thyroid Disease Sister      Allergies Son         food allergies, sees Dr Ambrocio       Social History:  Marital Status:   [2]  Social History     Tobacco Use     Smoking status: Never     Smokeless tobacco: Never   Vaping Use     Vaping Use: Never used   Substance Use Topics     Alcohol use: No     Drug use: No        Medications:    aspirin (ASA) 325 MG EC tablet  loratadine (CLARITIN) 10 MG tablet          Review of Systems   Constitutional: Negative.    Musculoskeletal: Negative.    Skin: Negative.    All other systems reviewed and are negative.      Physical Exam   BP: (!) 143/92  Pulse: 81  Temp: 97.9  F (36.6  C)  Height: 170.2 cm (5' 7\")  Weight: 117.9 kg (260 lb)  SpO2: 100 %      Physical Exam  Constitutional:       General: She is not in acute distress.     Appearance: Normal appearance. She is not ill-appearing, toxic-appearing or diaphoretic.   HENT:      Head: Normocephalic and atraumatic.   Eyes:      Conjunctiva/sclera: Conjunctivae normal.   Pulmonary:      Effort: Pulmonary effort is normal.   Musculoskeletal:         General: Normal range of motion.      Cervical back: Neck supple.   Skin:     " General: Skin is warm and dry.      Findings: No bruising, ecchymosis, signs of injury, laceration or wound.   Neurological:      General: No focal deficit present.      Mental Status: She is alert.         ED Course                 Procedures    No results found for this or any previous visit (from the past 24 hour(s)).    Medications - No data to display    Assessments & Plan (with Medical Decision Making)     Pt is a 47 year old female who presents with parent for evaluation of possible rabies exposure.  Patient reports that her daughter's rabbit was attacked by a mink while in its cage outside 2 days ago.  The rabbits sustained puncture wounds about the head.  Patient states that she quickly swept up the rabbit and put it in a towel and cleaned the wounds and brought it into the vet.  Patient may have potentially gotten some of the blood on her hands.  Patient is also concerned about potential mink saliva exposure.  Their rabbit was vaccinated against rabies yesterday and is home with instructions from vet to continue quarantining the rabbit for upwards of 4 weeks.  Patient did not sustain any bites or puncture wounds and did not come into direct contact with the mink.  Patient is concerned about possible rabies exposure in case she may have gotten the mink's saliva on her hands while handling her rabbit afterwards and could have potentially inadvertently touched her eyes at some point.  Patient therefore wonders if they should receive rabies postexposure prophylaxis.  Immunizations including tetanus are up-to-date.      Pt is afebrile on arrival.  Exam as above.  Discussed case with [facility].  They do not recommend rabies prophylaxis at this time as patient was not bit by the mink and had no direct exposure to the mink and did not receive copious amounts of animal saliva into an open wound.  Discussed recommendations with mother.  Return precautions were reviewed.  Hand-outs were  provided.    Patient was instructed to follow-up with PCP as needed for continued care and management.  She is to return to the ED for persistent and/or worsening symptoms.  Patient expressed understanding of the diagnosis and plan and was discharged home in good condition.    I have reviewed the nursing notes.    I have reviewed the findings, diagnosis, plan and need for follow up with the patient.    Discharge Medication List as of 11/9/2022  3:50 PM          Final diagnoses:   Encounter for possible prophylactic measures -  at the [facility] does not recommend rabies postexposure prophylaxis at this time as you were not bit by the mink.  Continue to monitor your rabbit for any neurologic signs or signs of rabies.       11/9/2022   [facility] EMERGENCY DEPT      Disclaimer:  This note consists of symbols derived from keyboarding, dictation and/or voice recognition software.  As a result, there may be errors in the script that have gone undetected.  Please consider this when interpreting information found in this chart.     Nay Maxwell PA-C  11/09/22 1804       Nay Maxwell PA-C  11/09/22 1804       Nay Maxwell PA-C  11/09/22 1805

## 2022-11-09 NOTE — DISCHARGE INSTRUCTIONS
at the [place] [facility] does not recommend rabies postexposure prophylaxis at this time as you were not bit by the mink.  Continue to monitor your rabbit for any neurologic signs or signs of rabies.

## 2022-11-20 ENCOUNTER — HEALTH MAINTENANCE LETTER (OUTPATIENT)
Age: 47
End: 2022-11-20

## 2022-11-30 ENCOUNTER — TRANSFERRED RECORDS (OUTPATIENT)
Dept: MULTI SPECIALTY CLINIC | Facility: CLINIC | Age: 47
End: 2022-11-30

## 2023-01-30 ENCOUNTER — OFFICE VISIT (OUTPATIENT)
Dept: FAMILY MEDICINE | Facility: CLINIC | Age: 48
End: 2023-01-30
Payer: COMMERCIAL

## 2023-01-30 ENCOUNTER — LAB (OUTPATIENT)
Dept: FAMILY MEDICINE | Facility: CLINIC | Age: 48
End: 2023-01-30

## 2023-01-30 VITALS
HEIGHT: 67 IN | HEART RATE: 76 BPM | WEIGHT: 265.1 LBS | OXYGEN SATURATION: 99 % | SYSTOLIC BLOOD PRESSURE: 133 MMHG | RESPIRATION RATE: 16 BRPM | DIASTOLIC BLOOD PRESSURE: 85 MMHG | BODY MASS INDEX: 41.61 KG/M2 | TEMPERATURE: 98.1 F

## 2023-01-30 DIAGNOSIS — Z00.01 ENCOUNTER FOR ROUTINE ADULT HEALTH EXAMINATION WITH ABNORMAL FINDINGS: Primary | ICD-10-CM

## 2023-01-30 DIAGNOSIS — Z80.0 FAMILY HISTORY OF PANCREATIC CANCER: ICD-10-CM

## 2023-01-30 DIAGNOSIS — Z12.11 SCREEN FOR COLON CANCER: ICD-10-CM

## 2023-01-30 DIAGNOSIS — R73.09 ELEVATED GLUCOSE: ICD-10-CM

## 2023-01-30 DIAGNOSIS — Z13.220 SCREENING FOR LIPOID DISORDERS: ICD-10-CM

## 2023-01-30 DIAGNOSIS — Z11.4 SCREENING FOR HIV (HUMAN IMMUNODEFICIENCY VIRUS): ICD-10-CM

## 2023-01-30 DIAGNOSIS — E66.01 MORBID OBESITY (H): ICD-10-CM

## 2023-01-30 DIAGNOSIS — M79.605 PAIN AND SWELLING OF LEFT LOWER EXTREMITY: ICD-10-CM

## 2023-01-30 DIAGNOSIS — M79.89 PAIN AND SWELLING OF LEFT LOWER EXTREMITY: ICD-10-CM

## 2023-01-30 DIAGNOSIS — R06.83 SNORING: ICD-10-CM

## 2023-01-30 DIAGNOSIS — I80.02 SUPERFICIAL THROMBOPHLEBITIS OF LEFT LEG: ICD-10-CM

## 2023-01-30 DIAGNOSIS — D69.6 THROMBOCYTOPENIA (H): ICD-10-CM

## 2023-01-30 DIAGNOSIS — Z11.59 NEED FOR HEPATITIS C SCREENING TEST: ICD-10-CM

## 2023-01-30 DIAGNOSIS — Z80.3 FAMILY HISTORY OF MALIGNANT NEOPLASM OF BREAST: ICD-10-CM

## 2023-01-30 LAB
ALBUMIN SERPL BCG-MCNC: 3.9 G/DL (ref 3.5–5.2)
ALP SERPL-CCNC: 77 U/L (ref 35–104)
ALT SERPL W P-5'-P-CCNC: 17 U/L (ref 10–35)
ANION GAP SERPL CALCULATED.3IONS-SCNC: 10 MMOL/L (ref 7–15)
AST SERPL W P-5'-P-CCNC: 12 U/L (ref 10–35)
BASOPHILS # BLD MANUAL: 0.1 10E3/UL (ref 0–0.2)
BASOPHILS NFR BLD MANUAL: 1 %
BILIRUB SERPL-MCNC: <0.2 MG/DL
BUN SERPL-MCNC: 16.3 MG/DL (ref 6–20)
CALCIUM SERPL-MCNC: 9.5 MG/DL (ref 8.6–10)
CHLORIDE SERPL-SCNC: 105 MMOL/L (ref 98–107)
CHOLEST SERPL-MCNC: 162 MG/DL
CREAT SERPL-MCNC: 0.84 MG/DL (ref 0.51–0.95)
DEPRECATED HCO3 PLAS-SCNC: 27 MMOL/L (ref 22–29)
EOSINOPHIL # BLD MANUAL: 0.2 10E3/UL (ref 0–0.7)
EOSINOPHIL NFR BLD MANUAL: 3 %
ERYTHROCYTE [DISTWIDTH] IN BLOOD BY AUTOMATED COUNT: 13.3 % (ref 10–15)
GFR SERPL CREATININE-BSD FRML MDRD: 86 ML/MIN/1.73M2
GLUCOSE SERPL-MCNC: 103 MG/DL (ref 70–99)
HBA1C MFR BLD: 5.7 % (ref 0–5.6)
HCT VFR BLD AUTO: 38.5 % (ref 35–47)
HDLC SERPL-MCNC: 62 MG/DL
HGB BLD-MCNC: 12.7 G/DL (ref 11.7–15.7)
LDLC SERPL CALC-MCNC: 50 MG/DL
LYMPHOCYTES # BLD MANUAL: 2 10E3/UL (ref 0.8–5.3)
LYMPHOCYTES NFR BLD MANUAL: 29 %
MCH RBC QN AUTO: 27.8 PG (ref 26.5–33)
MCHC RBC AUTO-ENTMCNC: 33 G/DL (ref 31.5–36.5)
MCV RBC AUTO: 84 FL (ref 78–100)
MONOCYTES # BLD MANUAL: 0.7 10E3/UL (ref 0–1.3)
MONOCYTES NFR BLD MANUAL: 10 %
NEUTROPHILS # BLD MANUAL: 3.9 10E3/UL (ref 1.6–8.3)
NEUTROPHILS NFR BLD MANUAL: 57 %
NONHDLC SERPL-MCNC: 100 MG/DL
NRBC # BLD AUTO: 0 10E3/UL
NRBC BLD AUTO-RTO: 0 /100
PLAT MORPH BLD: NORMAL
PLATELET # BLD AUTO: 44 10E3/UL (ref 150–450)
POTASSIUM SERPL-SCNC: 4.2 MMOL/L (ref 3.4–5.3)
PROT SERPL-MCNC: 6.6 G/DL (ref 6.4–8.3)
RBC # BLD AUTO: 4.57 10E6/UL (ref 3.8–5.2)
RBC MORPH BLD: NORMAL
SODIUM SERPL-SCNC: 142 MMOL/L (ref 136–145)
TRIGL SERPL-MCNC: 249 MG/DL
TSH SERPL DL<=0.005 MIU/L-ACNC: 2.46 UIU/ML (ref 0.3–4.2)
WBC # BLD AUTO: 6.9 10E3/UL (ref 4–11)

## 2023-01-30 PROCEDURE — 80050 GENERAL HEALTH PANEL: CPT | Performed by: PHYSICIAN ASSISTANT

## 2023-01-30 PROCEDURE — 80061 LIPID PANEL: CPT | Performed by: PHYSICIAN ASSISTANT

## 2023-01-30 PROCEDURE — 36415 COLL VENOUS BLD VENIPUNCTURE: CPT | Performed by: PHYSICIAN ASSISTANT

## 2023-01-30 PROCEDURE — 87389 HIV-1 AG W/HIV-1&-2 AB AG IA: CPT | Performed by: PHYSICIAN ASSISTANT

## 2023-01-30 PROCEDURE — 99396 PREV VISIT EST AGE 40-64: CPT | Performed by: PHYSICIAN ASSISTANT

## 2023-01-30 PROCEDURE — 82306 VITAMIN D 25 HYDROXY: CPT | Performed by: PHYSICIAN ASSISTANT

## 2023-01-30 PROCEDURE — 83036 HEMOGLOBIN GLYCOSYLATED A1C: CPT | Performed by: PHYSICIAN ASSISTANT

## 2023-01-30 ASSESSMENT — ENCOUNTER SYMPTOMS
ABDOMINAL PAIN: 0
HEARTBURN: 0
NERVOUS/ANXIOUS: 0
BREAST MASS: 0
CHILLS: 0
FREQUENCY: 0
SORE THROAT: 0
PARESTHESIAS: 0
SHORTNESS OF BREATH: 0
CONSTIPATION: 0
FEVER: 0
MYALGIAS: 0
JOINT SWELLING: 0
NAUSEA: 0
HEADACHES: 0
DIZZINESS: 0
ARTHRALGIAS: 0
EYE PAIN: 0
WEAKNESS: 0
HEMATOCHEZIA: 0
HEMATURIA: 0
DYSURIA: 0
COUGH: 0
PALPITATIONS: 0
DIARRHEA: 0

## 2023-01-30 NOTE — PATIENT INSTRUCTIONS
Look into weight management medications such as:   Contrave (wellbutrin-naltrexone), topamax, phentermine, GLP1 agonists (such as wegovy)  Follow up for video visit with me if interested  Can also consider weight management/nutrition referral    Contact OBGYN regarding if you need PAP smears in the future

## 2023-01-30 NOTE — PROGRESS NOTES
SUBJECTIVE:   CC: Daylin is an 47 year old who presents for preventive health visit.     Patient has been advised of split billing requirements and indicates understanding: Yes  Well Child  Pertinent negatives include no abdominal pain, arthralgias, chest pain, chills, congestion, coughing, fever, headaches, joint swelling, myalgias, nausea, rash, sore throat or weakness.   Healthy Habits:     Getting at least 3 servings of Calcium per day:  Yes    Bi-annual eye exam:  Yes    Dental care twice a year:  Yes    Sleep apnea or symptoms of sleep apnea:  Daytime drowsiness    Diet:  Regular (no restrictions)    Frequency of exercise:  2-3 days/week    Duration of exercise:  15-30 minutes    Taking medications regularly:  Yes    Medication side effects:  None    PHQ-2 Total Score: 2    Additional concerns today:  No    Saw vein specialist - swelling in left leg. Had previous superficial thrombophlebitis. Had imaging, recommended aspirin daily. Has follow up scheduled. Potential cautery/stent. Wearing compression.    She has mammograms through [facility]  Last mammogram 11/2022, normal    Not getting enough sleep, stressful job, working from home   says she snores, but doesn't think she has ROSELIA    Today's PHQ-2 Score:   PHQ-2 ( 1999 Pfizer) 1/30/2023   Q1: Little interest or pleasure in doing things 1   Q2: Feeling down, depressed or hopeless 1   PHQ-2 Score 2   PHQ-2 Total Score (12-17 Years)- Positive if 3 or more points; Administer PHQ-A if positive -   Q1: Little interest or pleasure in doing things Several days   Q2: Feeling down, depressed or hopeless Several days   PHQ-2 Score 2       Have you ever done Advance Care Planning? (For example, a Health Directive, POLST, or a discussion with a medical provider or your loved ones about your wishes): No, advance care planning information given to patient to review.  Patient plans to discuss their wishes with loved ones or provider.      Social History      Tobacco Use     Smoking status: Never     Smokeless tobacco: Never   Substance Use Topics     Alcohol use: No     If you drink alcohol do you typically have >3 drinks per day or >7 drinks per week? No    Alcohol Use 2023   Prescreen: >3 drinks/day or >7 drinks/week? No   Prescreen: >3 drinks/day or >7 drinks/week? -   No flowsheet data found.    Reviewed orders with patient.  Reviewed health maintenance and updated orders accordingly - Yes  Lab work is in process  Labs reviewed in EPIC  BP Readings from Last 3 Encounters:   23 133/85   22 132/82   22 132/85    Wt Readings from Last 3 Encounters:   23 120.2 kg (265 lb 1.6 oz)   22 117.9 kg (260 lb)   22 115.7 kg (255 lb)                  Patient Active Problem List   Diagnosis     24 hour contact handout given     Uterine polyp     History of  section     REBECCA III (cervical intraepithelial neoplasia grade III) with severe dysplasia     Morbid obesity (H)     Past Surgical History:   Procedure Laterality Date     HYSTERECTOMY  10/03/2022    vaginal hysterectomy. still has ovaries.       Social History     Tobacco Use     Smoking status: Never     Smokeless tobacco: Never   Substance Use Topics     Alcohol use: No     Family History   Problem Relation Age of Onset     Thyroid Disease Mother      Breast Cancer Mother 76     Lung Cancer Father      Thyroid Disease Sister      Pancreatic Cancer Sister 57     Stomach Cancer Maternal Grandmother      Prostate Cancer Maternal Grandfather      Allergies Son         food allergies, sees Dr Ambrocio     Ovarian Cancer Other         mom's cousin           Breast Cancer Screening:    FHS-7:   Breast CA Risk Assessment (FHS-7) 2023   Did any of your first-degree relatives have breast or ovarian cancer? Yes   Did any of your relatives have bilateral breast cancer? No   Did any man in your family have breast cancer? No   Did any woman in your family have breast and ovarian  cancer? No   Did any woman in your family have breast cancer before age 50 y? No   Do you have 2 or more relatives with breast and/or ovarian cancer? Yes   Do you have 2 or more relatives with breast and/or bowel cancer? No       Mammogram Screening: Recommended annual mammography  Pertinent mammograms are reviewed under the imaging tab.    History of abnormal Pap smear: YES - updated in Problem List and Health Maintenance accordingly  Many years ago abnormal PAP; LEEP procedure which was normal, normal paps since  Had hysterectomy due to heavy bleeding 10/2022    Last 3 Pap and HPV Results:       Reviewed and updated as needed this visit by clinical staff   Tobacco  Allergies  Meds  Problems  Med Hx  Surg Hx  Fam Hx  Soc   Hx        Reviewed and updated as needed this visit by Provider   Tobacco  Allergies  Meds  Problems  Med Hx  Surg Hx  Fam Hx         History reviewed. No pertinent past medical history.   Past Surgical History:   Procedure Laterality Date     HYSTERECTOMY  10/03/2022    vaginal hysterectomy. still has ovaries.       Review of Systems   Constitutional: Negative for chills and fever.   HENT: Negative for congestion, ear pain, hearing loss and sore throat.    Eyes: Negative for pain and visual disturbance.   Respiratory: Negative for cough and shortness of breath.    Cardiovascular: Positive for peripheral edema. Negative for chest pain and palpitations.   Gastrointestinal: Negative for abdominal pain, constipation, diarrhea, heartburn, hematochezia and nausea.   Breasts:  Negative for tenderness, breast mass and discharge.   Genitourinary: Negative for dysuria, frequency, genital sores, hematuria, pelvic pain, urgency, vaginal bleeding and vaginal discharge.   Musculoskeletal: Negative for arthralgias, joint swelling and myalgias.   Skin: Negative for rash.   Neurological: Negative for dizziness, weakness, headaches and paresthesias.   Psychiatric/Behavioral: Negative for mood  "changes. The patient is not nervous/anxious.           OBJECTIVE:   /85   Pulse 76   Temp 98.1  F (36.7  C) (Tympanic)   Resp 16   Ht 1.702 m (5' 7.01\")   Wt 120.2 kg (265 lb 1.6 oz)   SpO2 99%   BMI 41.51 kg/m    Physical Exam  GENERAL: healthy, alert and no distress  EYES: Eyes grossly normal to inspection, PERRL and conjunctivae and sclerae normal  HENT: ear canals and TM's normal, nose and mouth without ulcers or lesions  NECK: no adenopathy, no asymmetry, masses, or scars and thyroid normal to palpation  RESP: lungs clear to auscultation - no rales, rhonchi or wheezes  CV: regular rate and rhythm, normal S1 S2, no S3 or S4, no murmur, click or rub, no peripheral edema and peripheral pulses strong  ABDOMEN: soft, nontender, no hepatosplenomegaly, no masses and bowel sounds normal  MS: no gross musculoskeletal defects noted, POSITIVE chronic 1+ left lower extremity edema  SKIN: no suspicious lesions or rashes  NEURO: Normal strength and tone, mentation intact and speech normal  BACK: no CVA tenderness, no paralumbar tenderness  PSYCH: mentation appears normal, affect normal/bright  LYMPH: no cervical, supraclavicular, axillary, or inguinal adenopathy    Diagnostic Test Results:  Labs reviewed in Epic    ASSESSMENT/PLAN:     ASSESSMENT/PLAN:      ICD-10-CM    1. Encounter for routine adult health examination with abnormal findings  Z00.01       2. Thrombocytopenia (H)  D69.6 INR     Partial thromboplastin time     Lab Blood Morphology Pathologist Review     Adult Oncology/Hematology  Referral     CBC with platelets and differential      3. Family history of malignant neoplasm of breast  Z80.3 Cancer Risk Mgmt/Cancer Genetic Counseling Referral      4. Morbid obesity (H)  E66.01 TSH with free T4 reflex     Comprehensive metabolic panel (BMP + Alb, Alk Phos, ALT, AST, Total. Bili, TP)     Vitamin D Deficiency     CBC with platelets and differential     TSH with free T4 reflex     Comprehensive " metabolic panel (BMP + Alb, Alk Phos, ALT, AST, Total. Bili, TP)     Vitamin D Deficiency     CBC with platelets and differential      5. Family history of pancreatic cancer  Z80.0 Cancer Risk Mgmt/Cancer Genetic Counseling Referral      6. Screening for lipoid disorders  Z13.220 Lipid panel reflex to direct LDL Non-fasting     Lipid panel reflex to direct LDL Non-fasting      7. Elevated glucose  R73.09 Hemoglobin A1c     Comprehensive metabolic panel (BMP + Alb, Alk Phos, ALT, AST, Total. Bili, TP)     Hemoglobin A1c     Comprehensive metabolic panel (BMP + Alb, Alk Phos, ALT, AST, Total. Bili, TP)      8. Screening for HIV (human immunodeficiency virus)  Z11.4 HIV Antigen Antibody Combo     HIV Antigen Antibody Combo      9. Need for hepatitis C screening test  Z11.59 Hepatitis C Screen Reflex to HCV RNA Quant and Genotype      10. Screen for colon cancer  Z12.11 COLOGUARD([employer])      11. Superficial thrombophlebitis of left leg  I80.02       12. Pain and swelling of left lower extremity  M79.605     M79.89       13. Snoring  R06.83         We did briefly discuss weight management referral and medications. Not as interested in GLP1 agonist due to sister with pancreatic cancer. Phentermine may increase anxiety. Patient will look into this and follow up for discussion.    Will monitor blood pressure.  Will monitor snoring for now, declines referral.    Addendum: platelets 45, needs finalized count.  I called patient - no signs of bleeding (such as epistaxis, bleeding from gums, bruising, rectal bleeding). She has had hysterectomy but previously had menorrhagia.  She did not have trouble with clotting after blood draw today, and did not have trouble with bleeding/complications after hysterectomy this fall  Only recorded platelets lab in the past 5/2022 - 82  Patient will reach out to vascular tomorrow regarding staying on the aspirin. I also sent staff message.  Placed hematology consult, patient will  "return this week for further lab workup  Red flag signs to be seen urgently were discussed.    Patient Instructions   Look into weight management medications such as:   Contrave (wellbutrin-naltrexone), topamax, phentermine, GLP1 agonists (such as wegovy)  Follow up for video visit with me if interested  Can also consider weight management/nutrition referral    Contact OBGYN regarding if you need PAP smears in the future      COUNSELING:  Reviewed preventive health counseling, as reflected in patient instructions       Regular exercise       Healthy diet/nutrition       Immunizations       Consider Hep C screening for all patients one time for ages 18-79 years       HIV screeninx in teen years, 1x in adult years, and at intervals if high risk       The 10-year ASCVD risk score (Sanjeev LEMA, et al., 2019) is: 0.6%    Values used to calculate the score:      Age: 47 years      Sex: Female      Is Non- : No      Diabetic: No      Tobacco smoker: No      Systolic Blood Pressure: 133 mmHg      Is BP treated: No      HDL Cholesterol: 62 mg/dL      Total Cholesterol: 162 mg/dL      BMI:   Estimated body mass index is 41.51 kg/m  as calculated from the following:    Height as of this encounter: 1.702 m (5' 7.01\").    Weight as of this encounter: 120.2 kg (265 lb 1.6 oz).   Weight management plan: Discussed healthy diet and exercise guidelines      She reports that she has never smoked. She has never used smokeless tobacco.      Tamar Jackson PA-C  [facility]  "

## 2023-01-31 LAB
DEPRECATED CALCIDIOL+CALCIFEROL SERPL-MC: 30 UG/L (ref 20–75)
HIV 1+2 AB+HIV1 P24 AG SERPL QL IA: NONREACTIVE

## 2023-02-02 ENCOUNTER — APPOINTMENT (OUTPATIENT)
Dept: LAB | Facility: CLINIC | Age: 48
End: 2023-02-02

## 2023-02-02 NOTE — TELEPHONE ENCOUNTER
RECORDS STATUS - ALL OTHER DIAGNOSIS      RECORDS RECEIVED FROM: [facility]   DATE RECEIVED: 02/07/23   NOTES STATUS DETAILS   OFFICE NOTE from referring provider Epic 01/30/23: Tamar Jackson PA-C   MEDICATION LIST [facility]    LABS     PATHOLOGY REPORTS Report in EPIC 02/03/23: [id number]   ANYTHING RELATED TO DIAGNOSIS Epic Most recent 02/03/23

## 2023-02-03 ENCOUNTER — LAB (OUTPATIENT)
Dept: LAB | Facility: CLINIC | Age: 48
End: 2023-02-03
Payer: COMMERCIAL

## 2023-02-03 DIAGNOSIS — Z11.59 NEED FOR HEPATITIS C SCREENING TEST: ICD-10-CM

## 2023-02-03 DIAGNOSIS — D69.6 THROMBOCYTOPENIA (H): ICD-10-CM

## 2023-02-03 LAB
APTT PPP: 28 SECONDS (ref 22–38)
BASOPHILS # BLD AUTO: 0 10E3/UL (ref 0–0.2)
BASOPHILS NFR BLD AUTO: 1 %
EOSINOPHIL # BLD AUTO: 0.1 10E3/UL (ref 0–0.7)
EOSINOPHIL NFR BLD AUTO: 2 %
ERYTHROCYTE [DISTWIDTH] IN BLOOD BY AUTOMATED COUNT: 13.2 % (ref 10–15)
HCT VFR BLD AUTO: 41.7 % (ref 35–47)
HGB BLD-MCNC: 13.7 G/DL (ref 11.7–15.7)
IMM GRANULOCYTES # BLD: 0 10E3/UL
IMM GRANULOCYTES NFR BLD: 0 %
INR PPP: 1.05 (ref 0.85–1.15)
LYMPHOCYTES # BLD AUTO: 2.6 10E3/UL (ref 0.8–5.3)
LYMPHOCYTES NFR BLD AUTO: 41 %
MCH RBC QN AUTO: 27.4 PG (ref 26.5–33)
MCHC RBC AUTO-ENTMCNC: 32.9 G/DL (ref 31.5–36.5)
MCV RBC AUTO: 83 FL (ref 78–100)
MONOCYTES # BLD AUTO: 0.3 10E3/UL (ref 0–1.3)
MONOCYTES NFR BLD AUTO: 5 %
NEUTROPHILS # BLD AUTO: 3.3 10E3/UL (ref 1.6–8.3)
NEUTROPHILS NFR BLD AUTO: 51 %
NRBC # BLD AUTO: 0 10E3/UL
NRBC BLD AUTO-RTO: 0 /100
PA ADP BLD-ACNC: 162 PRU
PLATELET # BLD AUTO: 61 10E3/UL (ref 150–450)
RBC # BLD AUTO: 5 10E6/UL (ref 3.8–5.2)
WBC # BLD AUTO: 6.3 10E3/UL (ref 4–11)

## 2023-02-03 PROCEDURE — 85025 COMPLETE CBC W/AUTO DIFF WBC: CPT

## 2023-02-03 PROCEDURE — 36415 COLL VENOUS BLD VENIPUNCTURE: CPT

## 2023-02-03 PROCEDURE — 85730 THROMBOPLASTIN TIME PARTIAL: CPT

## 2023-02-03 PROCEDURE — 85576 BLOOD PLATELET AGGREGATION: CPT

## 2023-02-03 PROCEDURE — 85045 AUTOMATED RETICULOCYTE COUNT: CPT

## 2023-02-03 PROCEDURE — 86803 HEPATITIS C AB TEST: CPT

## 2023-02-03 PROCEDURE — 85610 PROTHROMBIN TIME: CPT

## 2023-02-04 LAB — HCV AB SERPL QL IA: NONREACTIVE

## 2023-02-06 LAB
PATH REPORT.COMMENTS IMP SPEC: NORMAL
PATH REPORT.FINAL DX SPEC: NORMAL
PATH REPORT.MICROSCOPIC SPEC OTHER STN: NORMAL
PATH REPORT.MICROSCOPIC SPEC OTHER STN: NORMAL
RETICS # AUTO: 0.08 10E6/UL (ref 0.03–0.1)
RETICS/RBC NFR AUTO: 1.7 % (ref 0.5–2)

## 2023-02-22 ENCOUNTER — PRE VISIT (OUTPATIENT)
Dept: ONCOLOGY | Facility: CLINIC | Age: 48
End: 2023-02-22

## 2023-02-22 ENCOUNTER — VIRTUAL VISIT (OUTPATIENT)
Dept: ONCOLOGY | Facility: CLINIC | Age: 48
End: 2023-02-22
Attending: PHYSICIAN ASSISTANT
Payer: COMMERCIAL

## 2023-02-22 DIAGNOSIS — D69.6 THROMBOCYTOPENIA (H): ICD-10-CM

## 2023-02-22 PROCEDURE — 99204 OFFICE O/P NEW MOD 45 MIN: CPT | Mod: VID | Performed by: INTERNAL MEDICINE

## 2023-02-22 NOTE — LETTER
2/22/2023         RE: Daylin Amaya  [address] [facility] [address]        Dear Colleague,    Thank you for referring your patient, Daylin Amaya, to the [facility]CER [facility]. Please see a copy of my visit note below.    Video-Visit Details    Type of service:  Video Visit    Video Start Time (time video started): 1:35 PM    Video End Time (time video stopped): 1:54 PM    Originating Location (pt. Location): Home      Distant Location (provider location):  Off-site    Mode of Communication:  Video Conference via [facility]ematology [facility] Oncology Outpatient Video Consult Note    Patient: Daylin Amaya  MRN: [id number]        Reason for Visit    I was consulted by Tamar LAM regarding thrombocytopenia    Assessment/Plan    It was a privilege to evaluate you for the following issues today:    1. Thrombocytopenia (H)      Based on clinical presentation and laboratory findings so far a diagnosis of immune thrombocytopenic purpura seems most likely but there is an outside chance of cytomegaly as a potential contributor to your low platelet count so I am going to order a spleen ultrasound and also repeat your CBC next week.    We discussed potential signs of worsening ITP/dangerously low platelet counts such as petechiae and blood blisters in the mouth as well as persistent bleeding and I advise you to come in to be seen and have your CBC checked immediately if you develop such signs/symptoms.    I also recommended that you follow-up in the hematology clinic at least once every 6 months and have a routine CBC drawn.    You could see your primary care provider in between your hematology visits and have CBC checked with them as well to make sure you are getting a CBC checked at least once every 3 months.    We also discussed the remote possibility that this could be an early sign of a more serious problem if you develop a low white blood cell count or  anemia in the future in addition to your low platelet count which would warrant a more aggressive work-up with bone marrow biopsy/aspiration then.    I spent a total of 45 minutes on the care of this patient on the day of service including face to face time and the remainder in chart review, care coordination, and documentation on the day of service.      History    Ms. Daylin Amaya is a 47 year old who has been referred for hematology consultation with me for an incidental finding of isolated thrombocytopenia on a routine CBC.  She has a history of superficial thrombophlebitis with occlusive and nonocclusive thrombus through the greater saphenous vein from the proximal thigh  through the mid calf which was diagnosed by ultrasound last April when she presented with left leg pain. She was treated with systemic anticoagulation with Xarelto with improvement in left leg pain and subsequent ultrasound in May and September 2022.     She tells me that she developed the blood clot in her left leg after taking birth control pills.  She was having heavy vaginal bleeding and earlier this year underwent a hysterectomy and is not on any female hormone replacement at this time.    In our records it seems that she was first noted to have had a low platelet count in May 2022.  At that time platelet count was 82,000.  She had another CBC drawn on January 30, 2023 which revealed a platelet count of 44,000. She had been on ASA since last May reduce her risk of developing recurrent venous thromboembolism which was stopped at that time and 3 days later she had another CBC drawn which showed that her platelet count had slightly improved to 61,000.  Peripheral blood morphology review was unremarkable except for the above-mentioned pancytopenia.    She denies abdominal pain or bloating.  She denies any persistent bleeding.  She has done a lot of research online and does not think that she has seen anything similar to petechiae anywhere  on her skin.  She does have some freckle -like changes  involving the skin of her foot which sound like chronic stasis changes of the skin.     Patient Active Problem List   Diagnosis     24 hour contact handout given     Uterine polyp     History of  section     REBECCA III (cervical intraepithelial neoplasia grade III) with severe dysplasia     Morbid obesity (H)       Current Outpatient Medications   Medication     loratadine (CLARITIN) 10 MG tablet     No current facility-administered medications for this visit.       Past History  No past medical history on file.  Past Surgical History:   Procedure Laterality Date     HYSTERECTOMY  10/03/2022    vaginal hysterectomy. still has ovaries.     Family History   Problem Relation Age of Onset     Thyroid Disease Mother      Breast Cancer Mother 76     Lung Cancer Father      Thyroid Disease Sister      Pancreatic Cancer Sister 57     Stomach Cancer Maternal Grandmother      Prostate Cancer Maternal Grandfather      Allergies Son         food allergies, sees Dr Ambrocio     Ovarian Cancer Other         mom's cousin     Social History     Socioeconomic History     Marital status:    Tobacco Use     Smoking status: Never     Smokeless tobacco: Never   Vaping Use     Vaping Use: Never used   Substance and Sexual Activity     Alcohol use: No     Drug use: No     Sexual activity: Yes     Partners: Male     Birth control/protection: Pill       Allergies    Allergies   Allergen Reactions     Amoxicillin Hives     In adulthood     Sulfa Drugs Hives     Clindamycin Diarrhea and Rash       Current Outpatient Medications   Medication     loratadine (CLARITIN) 10 MG tablet     No current facility-administered medications for this visit.         Signed by: Irving Hayes MD          Again, thank you for allowing me to participate in the care of your patient.        Sincerely,        Irving Hayes MD

## 2023-02-22 NOTE — PROGRESS NOTES
Video-Visit Details    Type of service:  Video Visit    Video Start Time (time video started): 1:35 PM    Video End Time (time video stopped): 1:54 PM    Originating Location (pt. Location): Home      Distant Location (provider location):  Off-site    Mode of Communication:  Video Conference via [facility] On[facility] Outpatient Video Consult Note    Patient: Daylin Amaya  MRN: [id number]        Reason for Visit    I was consulted by Tamar LAM regarding thrombocytopenia    Assessment/Plan    It was a privilege to evaluate you for the following issues today:    1. Thrombocytopenia (H)      Based on clinical presentation and laboratory findings so far a diagnosis of immune thrombocytopenic purpura seems most likely but there is an outside chance of splenomegaly as a potential contributor to your low platelet count so I am going to order a spleen ultrasound and also repeat your CBC next week.    We discussed potential signs of worsening ITP/dangerously low platelet counts such as petechiae and blood blisters in the mouth as well as persistent bleeding and I advise you to come in to be seen and have your CBC checked immediately if you develop such signs/symptoms.    I also recommended that you follow-up in the hematology clinic at least once every 6 months and have a routine CBC drawn.    You could see your primary care provider in between your hematology visits and have CBC checked with them as well to make sure you are getting a CBC checked at least once every 3 months.    We also discussed the remote possibility that this could be an early sign of a more serious problem if you develop a low white blood cell count or anemia in the future in addition to your low platelet count which would warrant a more aggressive work-up with bone marrow biopsy/aspiration then.    I spent a total of 45 minutes on the care of this patient on the day of service including face to face time  and the remainder in chart review, care coordination, and documentation on the day of service.      History    Ms. Daylin Amaya is a 47 year old who has been referred for hematology consultation with me for an incidental finding of isolated thrombocytopenia on a routine CBC.  She has a history of superficial thrombophlebitis with occlusive and nonocclusive thrombus through the greater saphenous vein from the proximal thigh  through the mid calf which was diagnosed by ultrasound last April when she presented with left leg pain. She was treated with systemic anticoagulation with Xarelto with improvement in left leg pain and subsequent ultrasound in May and September 2022.     She tells me that she developed the blood clot in her left leg after taking birth control pills.  She was having heavy vaginal bleeding and earlier this year underwent a hysterectomy and is not on any female hormone replacement at this time.    In our records it seems that she was first noted to have had a low platelet count in May 2022.  At that time platelet count was 82,000.  She had another CBC drawn on January 30, 2023 which revealed a platelet count of 44,000. She had been on ASA since last May reduce her risk of developing recurrent venous thromboembolism which was stopped at that time and 3 days later she had another CBC drawn which showed that her platelet count had slightly improved to 61,000.  Peripheral blood morphology review was unremarkable except for the above-mentioned pancytopenia.    She denies abdominal pain or bloating.  She denies any persistent bleeding.  She has done a lot of research online and does not think that she has seen anything similar to petechiae anywhere on her skin.  She does have some freckle -like changes  involving the skin of her foot which sound like chronic stasis changes of the skin.     Patient Active Problem List   Diagnosis     24 hour contact handout given     Uterine polyp     History of   section     REBECCA III (cervical intraepithelial neoplasia grade III) with severe dysplasia     Morbid obesity (H)       Current Outpatient Medications   Medication     loratadine (CLARITIN) 10 MG tablet     No current facility-administered medications for this visit.       Past History  No past medical history on file.  Past Surgical History:   Procedure Laterality Date     HYSTERECTOMY  10/03/2022    vaginal hysterectomy. still has ovaries.     Family History   Problem Relation Age of Onset     Thyroid Disease Mother      Breast Cancer Mother 76     Lung Cancer Father      Thyroid Disease Sister      Pancreatic Cancer Sister 57     Stomach Cancer Maternal Grandmother      Prostate Cancer Maternal Grandfather      Allergies Son         food allergies, sees Dr Ambrocio     Ovarian Cancer Other         mom's cousin     Social History     Socioeconomic History     Marital status:    Tobacco Use     Smoking status: Never     Smokeless tobacco: Never   Vaping Use     Vaping Use: Never used   Substance and Sexual Activity     Alcohol use: No     Drug use: No     Sexual activity: Yes     Partners: Male     Birth control/protection: Pill       Allergies    Allergies   Allergen Reactions     Amoxicillin Hives     In adulthood     Sulfa Drugs Hives     Clindamycin Diarrhea and Rash       Current Outpatient Medications   Medication     loratadine (CLARITIN) 10 MG tablet     No current facility-administered medications for this visit.         Signed by: Irving Hayes MD

## 2023-02-22 NOTE — PATIENT INSTRUCTIONS
It was a privilege to evaluate you for the following issues today:     1. Thrombocytopenia (H)       Based on clinical presentation and laboratory findings so far a diagnosis of immune thrombocytopenic purpura seems most likely but there is an outside chance of cytomegaly as a potential contributor to your low platelet count so I am going to order a spleen ultrasound and also repeat your CBC next week.     We discussed potential signs of worsening ITP/dangerously low platelet counts such as petechiae and blood blisters in the mouth as well as persistent bleeding and I advise you to come in to be seen and have your CBC checked immediately if you develop such signs/symptoms.     I also recommended that you follow-up in the hematology clinic at least once every 6 months and have a routine CBC drawn.     You could see your primary care provider in between your hematology visits and have CBC checked with them as well to make sure you are getting a CBC checked at least once every 3 months.     We also discussed the remote possibility that this could be an early sign of a more serious problem if you develop a low white blood cell count or anemia in the future in addition to your low platelet count which would warrant a more aggressive work-up with bone marrow biopsy/aspiration then.     I spent a total of 45 minutes on the care of this patient on the day of service including face to face time and the remainder in chart review, care coordination, and documentation on the day of service.

## 2023-02-22 NOTE — LETTER
2/22/2023         RE: Daylin Amaya  [address] [facility] [address]        Dear Colleague,    Thank you for referring your patient, Daylin Amaya, to the [facility]. Please see a copy of my visit note below.    Video-Visit Details    Type of service:  Video Visit    Video Start Time (time video started): 1:35 PM    Video End Time (time video stopped): 1:54 PM    Originating Location (pt. Location): Home      Distant Location (provider location):  Off-site    Mode of Communication:  Video Conference via [facility] Hematology and Oncology Outpatient Video Consult Note    Patient: Daylin Amaya  MRN: [id number]        Reason for Visit    I was consulted by Tamar LAM regarding thrombocytopenia    Assessment/Plan    It was a privilege to evaluate you for the following issues today:    1. Thrombocytopenia (H)      Based on clinical presentation and laboratory findings so far a diagnosis of immune thrombocytopenic purpura seems most likely but there is an outside chance of cytomegaly as a potential contributor to your low platelet count so I am going to order a spleen ultrasound and also repeat your CBC next week.    We discussed potential signs of worsening ITP/dangerously low platelet counts such as petechiae and blood blisters in the mouth as well as persistent bleeding and I advise you to come in to be seen and have your CBC checked immediately if you develop such signs/symptoms.    I also recommended that you follow-up in the hematology clinic at least once every 6 months and have a routine CBC drawn.    You could see your primary care provider in between your hematology visits and have CBC checked with them as well to make sure you are getting a CBC checked at least once every 3 months.    We also discussed the remote possibility that this could be an early sign of a more serious problem if you develop a low white blood cell count or  anemia in the future in addition to your low platelet count which would warrant a more aggressive work-up with bone marrow biopsy/aspiration then.    I spent a total of 45 minutes on the care of this patient on the day of service including face to face time and the remainder in chart review, care coordination, and documentation on the day of service.      History    Ms. Daylin Amaya is a 47 year old who has been referred for hematology consultation with me for an incidental finding of isolated thrombocytopenia on a routine CBC.  She has a history of superficial thrombophlebitis with occlusive and nonocclusive thrombus through the greater saphenous vein from the proximal thigh  through the mid calf which was diagnosed by ultrasound last April when she presented with left leg pain. She was treated with systemic anticoagulation with Xarelto with improvement in left leg pain and subsequent ultrasound in May and September 2022.     She tells me that she developed the blood clot in her left leg after taking birth control pills.  She was having heavy vaginal bleeding and earlier this year underwent a hysterectomy and is not on any female hormone replacement at this time.    In our records it seems that she was first noted to have had a low platelet count in May 2022.  At that time platelet count was 82,000.  She had another CBC drawn on January 30, 2023 which revealed a platelet count of 44,000. She had been on ASA since last May reduce her risk of developing recurrent venous thromboembolism which was stopped at that time and 3 days later she had another CBC drawn which showed that her platelet count had slightly improved to 61,000.  Peripheral blood morphology review was unremarkable except for the above-mentioned pancytopenia.    She denies abdominal pain or bloating.  She denies any persistent bleeding.  She has done a lot of research online and does not think that she has seen anything similar to petechiae anywhere  on her skin.  She does have some freckle -like changes  involving the skin of her foot which sound like chronic stasis changes of the skin.     Patient Active Problem List   Diagnosis     24 hour contact handout given     Uterine polyp     History of  section     REBECCA III (cervical intraepithelial neoplasia grade III) with severe dysplasia     Morbid obesity (H)       Current Outpatient Medications   Medication     loratadine (CLARITIN) 10 MG tablet     No current facility-administered medications for this visit.       Past History  No past medical history on file.  Past Surgical History:   Procedure Laterality Date     HYSTERECTOMY  10/03/2022    vaginal hysterectomy. still has ovaries.     Family History   Problem Relation Age of Onset     Thyroid Disease Mother      Breast Cancer Mother 76     Lung Cancer Father      Thyroid Disease Sister      Pancreatic Cancer Sister 57     Stomach Cancer Maternal Grandmother      Prostate Cancer Maternal Grandfather      Allergies Son         food allergies, sees Dr Ambrocio     Ovarian Cancer Other         mom's cousin     Social History     Socioeconomic History     Marital status:    Tobacco Use     Smoking status: Never     Smokeless tobacco: Never   Vaping Use     Vaping Use: Never used   Substance and Sexual Activity     Alcohol use: No     Drug use: No     Sexual activity: Yes     Partners: Male     Birth control/protection: Pill       Allergies    Allergies   Allergen Reactions     Amoxicillin Hives     In adulthood     Sulfa Drugs Hives     Clindamycin Diarrhea and Rash       Current Outpatient Medications   Medication     loratadine (CLARITIN) 10 MG tablet     No current facility-administered medications for this visit.         Signed by: Irving Hayes MD          Again, thank you for allowing me to participate in the care of your patient.        Sincerely,        Irving Hayes MD

## 2023-03-02 ENCOUNTER — VIRTUAL VISIT (OUTPATIENT)
Dept: VASCULAR SURGERY | Facility: CLINIC | Age: 48
End: 2023-03-02
Attending: SURGERY
Payer: COMMERCIAL

## 2023-03-02 DIAGNOSIS — I80.02 SUPERFICIAL THROMBOPHLEBITIS OF LEFT LEG: Primary | ICD-10-CM

## 2023-03-02 PROCEDURE — 99213 OFFICE O/P EST LOW 20 MIN: CPT | Mod: VID | Performed by: SURGERY

## 2023-03-02 NOTE — PROGRESS NOTES
"[facility]        Patient is here for a 6 follow up  to discuss Veins    Pt is currently taking no meds that would impact our treatment plan.    Vitals - Patient Reported  Weight (Patient Reported): 118.8 kg (262 lb)  Height (Patient Reported): 170.2 cm (5' 7\")  BMI (Based on Pt Reported Ht/Wt): 41.03  Pain Score: No Pain (0)      The provider has been notified that the patient has no concerns.     Questions patient would like addressed today are: N/A.    Refills are needed: No    Has homecare services and agency name:  Li Carey, Visit Facilitator/MA.    "

## 2023-03-02 NOTE — NURSING NOTE
Chief Complaint   Patient presents with     Follow Up     6 month follow up, pt states she still has swelling in left leg, she was also directed to stop taking daily aspirin. She stated she is also to have additional labs, due to recent abnormal labs per pt. Medications/allergies reviewed with pt, up to date.        Is the patient currently in the state of [place]? YES    Visit mode:VIDEO    If the visit is dropped, the patient can be reconnected by: VIDEO VISIT: Send to e-mail at: [email or web address]    Will anyone else be joining the visit? NO      How would you like to obtain your AVS? MyChart    Are changes needed to the allergy or medication list? YES: Pt was instructed to stop taking daily Aspirin    Patient denies any changes since check-in regarding medication and allergies and states all information entered during check-in remains accurate.    Saeed Carey, Visit Facilitator/MA.

## 2023-03-02 NOTE — PROGRESS NOTES
VASCULAR SURGERY VIDEO CONSULTATION    VASCULAR SURGEON: Robert Paulino MD, RPVI     LOCATION:  [facility]     Daylin Amaya   Medical Record #:  [id number]  YOB: 1975  Age:  47 year old     Date of Service: 3/2/2023    PRIMARY CARE PROVIDER: Tamar Jackson      Reason for visit:  Six month follow up for venous insufficiency    ASSESSMENT & PLAN:  Ms. Amaya is a 46yo woman with left lower extremity superficial venous insufficiency and history of superficial thrombophlebitis. She continues to have swelling and discomfort in the left leg. She is wearing compression most days. She is being worked up for thrombocytopenia. Will reevaluate for RFA ablation after completion of hematology workup and improvement in her platelet count. Will also reorder compression socks.    HPI:  Daylin Amaya is a 47 year old female who was seen today in consultation for left lower extremity venous insufficiency. She was last seen in September at which time she reported that her symptoms had improved with compression. Today she reports that she continues to have swelling and discomfort in the left leg. She wears her compression several times a week but not every day. She works remotely and sits most of the day. She is interested in intervention to improve her symptoms.    She was noted to be thrombocytopenic with platelets of 44 at her last checkup. Her aspirin was stopped at that time. A peripheral smear noted no additional abnormalities. She has an abdominal ultrasound scheduled to evaluate her spleen and will get additional labs in 3-6 months. She has not had episodes of abnormal bleeding.    REVIEW OF SYSTEMS:    A 12 point ROS was reviewed and is negative except as mentioned in HPI.     PHH:  No past medical history on file.       Past Surgical History:   Procedure Laterality Date     HYSTERECTOMY  10/03/2022    vaginal hysterectomy. still has ovaries.       ALLERGIES:  Amoxicillin, Sulfa  drugs, and Clindamycin    MEDS:    Current Outpatient Medications:      loratadine (CLARITIN) 10 MG tablet, Take 10 mg by mouth daily (Patient not taking: Reported on 3/2/2023), Disp: , Rfl:     SOCIAL HABITS:    History   Smoking Status     Never   Smokeless Tobacco     Never     Social History    Substance and Sexual Activity      Alcohol use: No      History   Drug Use No       FAMILY HISTORY:    Family History   Problem Relation Age of Onset     Thyroid Disease Mother      Breast Cancer Mother 76     Lung Cancer Father      Thyroid Disease Sister      Pancreatic Cancer Sister 57     Stomach Cancer Maternal Grandmother      Prostate Cancer Maternal Grandfather      Allergies Son         food allergies, sees Dr Ambrocio     Ovarian Cancer Other         mom's cousin       PE:  There were no vitals taken for this visit.  Wt Readings from Last 1 Encounters:   01/30/23 120.2 kg (265 lb 1.6 oz)     There is no height or weight on file to calculate BMI.    EXAM:  GENERAL: This is a well-developed 47 year old female who appears her stated age  EYES: Grossly normal.  MOUTH: Buccal mucosa normal     Exam limited by virtual visit      DIAGNOSTIC STUDIES:     Images:  CT reviewed - no May Thurner  Insufficiency in the L GSV, deep veins competent    LABS:      Sodium   Date Value Ref Range Status   01/30/2023 142 136 - 145 mmol/L Final   05/11/2022 141 133 - 144 mmol/L Final     Urea Nitrogen   Date Value Ref Range Status   01/30/2023 16.3 6.0 - 20.0 mg/dL Final   05/11/2022 12 7 - 30 mg/dL Final     Hemoglobin   Date Value Ref Range Status   02/03/2023 13.7 11.7 - 15.7 g/dL Final   01/30/2023 12.7 11.7 - 15.7 g/dL Final   05/11/2022 11.1 (L) 11.7 - 15.7 g/dL Final     Platelet Count   Date Value Ref Range Status   02/03/2023 61 (L) 150 - 450 10e3/uL Final   01/30/2023 44 (LL) 150 - 450 10e3/uL Final   05/11/2022 82 (L) 150 - 450 10e3/uL Final     INR   Date Value Ref Range Status   02/03/2023 1.05 0.85 - 1.15 Final          Aylin Wesley MD  VASCULAR SURGERY FELLOW

## 2023-03-02 NOTE — PATIENT INSTRUCTIONS
Jose Manuel Mao,    Thank you for entrusting your care with us today. After your visit today with MD Robert Paulino this is the plan that was discussed at your appointment.    New compression stocking order sent to [facility] - please see list of locations below.    Please call to make follow up appointment with Dr. Paulino, after you have seen hematology      Please do not hesitate to reach out to us if you felt we did not answer your questions or you are unsure of the treatment plan after your visit today. Our number is [phone].Thank you for trusting us with your care.   Please call one of the [facility] locations below to schedule an appointment. If you received a prescription please bring it with you to your appointment. Some locations are limited to what they carry.     Office Locations    [facility] [place]  [address] [place] [address]  Home Medical Equipment, [address]   Phone: [phone]   Orthotics and Prosthetics, [address]   Phone: [phone]    [facility]  Home Medical Equipment  [address], [address], [place] [address]   Phone: [phone]    [facility]st[facility] ([facility])    [address], [address], [place] [address]  Phone: [phone]    [facility]  [address]   [place] [address]   Phone: [phone]    [facility] Bld[facility]  [address]  [place] [address]  Phone: [phone]    [facility] Bld[facility]   [address]  [place] [address]  Phone: [phone]    [facility]  [address]  [place] [address]  Phone: [phone]    [facility]  [address]  [address]  [place] [address]  Phone: [phone]    [place] [address] [place] [address]   Phone: [phone]

## 2023-03-03 ENCOUNTER — LAB (OUTPATIENT)
Dept: LAB | Facility: CLINIC | Age: 48
End: 2023-03-03
Attending: INTERNAL MEDICINE
Payer: COMMERCIAL

## 2023-03-03 ENCOUNTER — PATIENT OUTREACH (OUTPATIENT)
Dept: ONCOLOGY | Facility: CLINIC | Age: 48
End: 2023-03-03

## 2023-03-03 ENCOUNTER — HOSPITAL ENCOUNTER (OUTPATIENT)
Dept: ULTRASOUND IMAGING | Facility: CLINIC | Age: 48
Discharge: HOME | End: 2023-03-03
Attending: INTERNAL MEDICINE
Payer: COMMERCIAL

## 2023-03-03 DIAGNOSIS — D69.6 THROMBOCYTOPENIA (H): ICD-10-CM

## 2023-03-03 DIAGNOSIS — D69.6 THROMBOCYTOPENIA (H): Primary | ICD-10-CM

## 2023-03-03 LAB
BASOPHILS # BLD AUTO: 0 10E3/UL (ref 0–0.2)
BASOPHILS NFR BLD AUTO: 1 %
EOSINOPHIL # BLD AUTO: 0.1 10E3/UL (ref 0–0.7)
EOSINOPHIL NFR BLD AUTO: 2 %
ERYTHROCYTE [DISTWIDTH] IN BLOOD BY AUTOMATED COUNT: 13.4 % (ref 10–15)
HCT VFR BLD AUTO: 38.6 % (ref 35–47)
HGB BLD-MCNC: 13 G/DL (ref 11.7–15.7)
HOLD SPECIMEN: NORMAL
IMM GRANULOCYTES # BLD: 0 10E3/UL
IMM GRANULOCYTES NFR BLD: 1 %
LYMPHOCYTES # BLD AUTO: 2.5 10E3/UL (ref 0.8–5.3)
LYMPHOCYTES NFR BLD AUTO: 32 %
MCH RBC QN AUTO: 28 PG (ref 26.5–33)
MCHC RBC AUTO-ENTMCNC: 33.7 G/DL (ref 31.5–36.5)
MCV RBC AUTO: 83 FL (ref 78–100)
MONOCYTES # BLD AUTO: 0.4 10E3/UL (ref 0–1.3)
MONOCYTES NFR BLD AUTO: 5 %
NEUTROPHILS # BLD AUTO: 4.8 10E3/UL (ref 1.6–8.3)
NEUTROPHILS NFR BLD AUTO: 59 %
NONINV COLON CA DNA+OCC BLD SCRN STL QL: POSITIVE
NRBC # BLD AUTO: 0 10E3/UL
NRBC BLD AUTO-RTO: 0 /100
PLAT MORPH BLD: ABNORMAL
PLATELET # BLD AUTO: 41 10E3/UL (ref 150–450)
RBC # BLD AUTO: 4.65 10E6/UL (ref 3.8–5.2)
RBC MORPH BLD: ABNORMAL
WBC # BLD AUTO: 7.8 10E3/UL (ref 4–11)

## 2023-03-03 PROCEDURE — 36415 COLL VENOUS BLD VENIPUNCTURE: CPT

## 2023-03-03 PROCEDURE — 76705 ECHO EXAM OF ABDOMEN: CPT

## 2023-03-03 PROCEDURE — 85025 COMPLETE CBC W/AUTO DIFF WBC: CPT

## 2023-03-03 NOTE — CONFIDENTIAL NOTE
DATE:  3/3/2023   TIME OF RECEIPT FROM LAB:  10:28  LAB TEST:  Platelets  LAB VALUE:  41  RESULTS GIVEN WITH READ-BACK TO (PROVIDER):  SURI ALVAREZ  TIME LAB VALUE REPORTED TO PROVIDER:   10:29

## 2023-03-03 NOTE — CONFIDENTIAL NOTE
"Per Dr. Hayes:    \"Please repeat a CBC on Tuesday and schedule the patient for a virtual visit with me on Tuesday\"      Patient notified and in agreement with plan.Juana Medina RN on 3/3/2023 at 12:15 PM  .     "

## 2023-03-06 DIAGNOSIS — R19.5 POSITIVE COLORECTAL CANCER SCREENING USING COLOGUARD TEST: Primary | ICD-10-CM

## 2023-03-07 ENCOUNTER — LAB (OUTPATIENT)
Dept: LAB | Facility: CLINIC | Age: 48
End: 2023-03-07
Payer: COMMERCIAL

## 2023-03-07 DIAGNOSIS — D69.6 THROMBOCYTOPENIA (H): ICD-10-CM

## 2023-03-07 LAB
BASOPHILS # BLD AUTO: 0 10E3/UL (ref 0–0.2)
BASOPHILS NFR BLD AUTO: 0 %
EOSINOPHIL # BLD AUTO: 0.1 10E3/UL (ref 0–0.7)
EOSINOPHIL NFR BLD AUTO: 2 %
ERYTHROCYTE [DISTWIDTH] IN BLOOD BY AUTOMATED COUNT: 13.4 % (ref 10–15)
HCT VFR BLD AUTO: 38.1 % (ref 35–47)
HGB BLD-MCNC: 13 G/DL (ref 11.7–15.7)
IMM GRANULOCYTES # BLD: 0 10E3/UL
IMM GRANULOCYTES NFR BLD: 1 %
LYMPHOCYTES # BLD AUTO: 2.7 10E3/UL (ref 0.8–5.3)
LYMPHOCYTES NFR BLD AUTO: 34 %
MCH RBC QN AUTO: 28.2 PG (ref 26.5–33)
MCHC RBC AUTO-ENTMCNC: 34.1 G/DL (ref 31.5–36.5)
MCV RBC AUTO: 83 FL (ref 78–100)
MONOCYTES # BLD AUTO: 0.5 10E3/UL (ref 0–1.3)
MONOCYTES NFR BLD AUTO: 6 %
NEUTROPHILS # BLD AUTO: 4.7 10E3/UL (ref 1.6–8.3)
NEUTROPHILS NFR BLD AUTO: 57 %
NRBC # BLD AUTO: 0 10E3/UL
NRBC BLD AUTO-RTO: 0 /100
PLATELET # BLD AUTO: 54 10E3/UL (ref 150–450)
RBC # BLD AUTO: 4.61 10E6/UL (ref 3.8–5.2)
WBC # BLD AUTO: 8.1 10E3/UL (ref 4–11)

## 2023-03-07 PROCEDURE — 36415 COLL VENOUS BLD VENIPUNCTURE: CPT

## 2023-03-07 PROCEDURE — 85025 COMPLETE CBC W/AUTO DIFF WBC: CPT

## 2023-03-09 ENCOUNTER — TELEPHONE (OUTPATIENT)
Dept: ONCOLOGY | Facility: CLINIC | Age: 48
End: 2023-03-09

## 2023-03-09 NOTE — TELEPHONE ENCOUNTER
----- Message from Candice Connelly RN sent at 3/8/2023  4:48 PM CST -----  Regarding: RE: mutual patient with thrombocytopenia  Sched 03.28 with Dr. Wright.     Thank you  Candice   ----- Message -----  From: Irving Hayes MD  Sent: 3/6/2023   5:23 PM CST  To: Candice Connelly RN, Juana Medina RN, #  Subject: RE: mutual patient with thrombocytopenia         Hi Deyanira,    We can have her do a hematology MD virtual visit and re-assess whether she would need any additional treatment for thrombocytopenia given her need for colonoscopy.    I have cc'd Faiza/Candice to arrange for the hematology followup.    Thank you,    Irving      ----- Message -----  From: Tamar Jackson PA-C  Sent: 3/6/2023   7:20 AM CST  To: Irving Hayes MD  Subject: mutual patient with thrombocytopenia             Hello Dr. Hayes,  This patient is seeing you for thrombocytopenia. She just had positive cologuard screening test and colonoscopy is recommended. I want to see if you would like her to wait on the colonoscopy due to the thrombocytopenia. Please let me know your opinion, thank you.  Deyanira Jackson PA-C

## 2023-03-28 ENCOUNTER — VIRTUAL VISIT (OUTPATIENT)
Dept: ONCOLOGY | Facility: CLINIC | Age: 48
End: 2023-03-28
Attending: PHYSICIAN ASSISTANT
Payer: COMMERCIAL

## 2023-03-28 DIAGNOSIS — D69.3 IDIOPATHIC THROMBOCYTOPENIC PURPURA (H): Primary | ICD-10-CM

## 2023-03-28 PROCEDURE — 99214 OFFICE O/P EST MOD 30 MIN: CPT | Mod: VID | Performed by: INTERNAL MEDICINE

## 2023-03-28 NOTE — LETTER
3/28/2023         RE: Daylin Amaya  [address] [address]        Dear Colleague,    Thank you for referring your patient, Daylin Amaya, to the [facility] [place]. Please see a copy of my visit note below.    Virtual Visit Details    Type of service:  Video Visit     Video start time: 3:36 PM  Video end time: 3:51 PM    Originating Location (pt. Location): Home    Distant Location (provider location):  Off-site  Platform used for Video Visit: [facility] Hematology and Oncology Progress Note    Patient: Daylin Amaya  MRN: [id number]  Date of Service: 03/28/2023        Reason for Visit    Chief Complaint   Patient presents with     RECHECK     Video visit          Problem List Items Addressed This Visit        Immune    Idiopathic thrombocytopenic purpura (H) - Primary         Assessment and Plan  ITP  I reviewed the diagnosis of ITP in detail with her.  I reviewed its natural course, complications and treatment options.  Her most recent platelet count from 3/7/2023 was 54,000.  She has had some fluctuations in her counts in the recent past she is actually reassuring.  No evidence of any other blood count abnormalities to suggest a primary bone marrow pathology.  I explained to her that ITP is an immune mediated phenomenon and unfortunately there is no diagnostic test for this.  It is basically a diagnosis of exclusion and if there are no other secondary or obvious reasons for thrombocytopenia then we deemed it as ITP.  She also has some giant platelets in the peripheral blood which is also seen in ITP.  Her platelet count is above the threshold for treatment.  I reviewed the indications for treatment in ITP which is usually any clinically significant bleeding and or platelet count less than 20,000 (arbitrarily chosen number).     As far as treating her ITP prior to an elective procedure is concerned, it depends upon the type of the procedure  and the risk of bleeding and the comfort level of the proceduralist.  Typically for an endoscopic procedure and a platelet threshold of 50,000 or above is sufficient to do it safely.  So considering that her recent blood count was more than 50,000 I would touch base with GI to see if they are comfortable with this number to proceed with a colonoscopy.  If they are not then I would recommend trialing high-dose dexamethasone to see if there is a slight platelet count.    She is agreeable to the plan.    For surveillance we could potentially consider rechecking her platelet count once every 3 to 6 months.  I will also add an immature platelet fraction to the labs.  If blood counts are stable and she is not symptomatic then we can decrease the frequency of test to once or twice a year or as clinically indicated.       Cancer Staging   No matching staging information was found for the patient.    ECOG Performance    0 - Independent         Problem List    Patient Active Problem List   Diagnosis     24 hour contact handout given     Uterine polyp     History of  section     REBECCA III (cervical intraepithelial neoplasia grade III) with severe dysplasia     Morbid obesity (H)     Idiopathic thrombocytopenic purpura (H)        Oncology history      Interval History   Daylin Amaya is a 47 year old female with chronic thrombocytopenia who is seen as a revisit to discuss further management.    She was seen by my colleague in the past for evaluation management of chronic thrombocytopenia.  It was incidentally found in the lab study.  Prior to that she had a history of superficial thrombophlebitis with occlusive and nonocclusive thrombus to the greater saphenous vein.  She had presented with leg pain and swelling.  The clot was thought to be provoked from estrogen-containing oral conceptive pills.  She was put on anticoagulation for short period of time.  She is currently off of it.     Platelet count was 82,000 in May  2022 and since then it has fluctuated up and down.  Most recently in early March it was 54,000.  Since there was no obvious secondary cause for her some cytopenia it was deemed that she probably had ITP.  Recently she had a Cologuard test which came back positive.  She is being scheduled for a possible colonoscopy and wanted our opinion regarding management of thrombocytopenia in this setting.    She does have some fatigue and minor petechiae in her feet.  But no significant bleeding anywhere.  No gum bleeds or nosebleeds.  No bright red blood per rectum.  No significant weight loss.  No enlarging lymph nodes.  No fevers or chills.  Other blood counts are within normal limits.  Hepatitis and HIV testing have been negative.  No splenomegaly on imaging.  LFTs are within normal limits.        Review of Systems  A comprehensive review of systems was negative except for what is noted in the interval history    Current Outpatient Medications   Medication     loratadine (CLARITIN) 10 MG tablet     No current facility-administered medications for this visit.        Physical Exam    No flowsheet data found.    General: alert and cooperative      Lab Results    No results found for this or any previous visit (from the past 168 hour(s)).    Imaging    US Abdomen Limited    Result Date: 3/3/2023  ULTRASOUND ABDOMEN LIMITED  3/3/2023 9:04 AM HISTORY: Thrombocytopenia. Evaluate spleen size. TECHNIQUE: Limited abdominal ultrasound to the left upper quadrant is performed. COMPARISON: None. FINDINGS: The spleen shows no focal lesions and is normal in size measuring up to 12.3 cm.      IMPRESSION:  Normal-appearing spleen.  GAVIN TRAN MD   SYSTEM ID:  [id number]        Signed by: Kendy Wright MD        Again, thank you for allowing me to participate in the care of your patient.        Sincerely,        Kendy Wright MD

## 2023-03-28 NOTE — LETTER
3/28/2023         RE: Daylin Amaya  [address] [address]        Dear Colleague,    Thank you for referring your patient, Daylin Amaya, to the [facility] [place]. Please see a copy of my visit note below.    Virtual Visit Details    Type of service:  Video Visit     Video start time: 3:36 PM  Video end time: 3:51 PM    Originating Location (pt. Location): Home    Distant Location (provider location):  Off-site  Platform used for Video Visit: [facility] Hematology and Oncology Progress Note    Patient: Daylin Amaya  MRN: [id number]  Date of Service: 03/28/2023        Reason for Visit    Chief Complaint   Patient presents with     RECHECK     Video visit          Problem List Items Addressed This Visit        Immune    Idiopathic thrombocytopenic purpura (H) - Primary         Assessment and Plan  ITP  I reviewed the diagnosis of ITP in detail with her.  I reviewed its natural course, complications and treatment options.  Her most recent platelet count from 3/7/2023 was 54,000.  She has had some fluctuations in her counts in the recent past she is actually reassuring.  No evidence of any other blood count abnormalities to suggest a primary bone marrow pathology.  I explained to her that ITP is an immune mediated phenomenon and unfortunately there is no diagnostic test for this.  It is basically a diagnosis of exclusion and if there are no other secondary or obvious reasons for thrombocytopenia then we deemed it as ITP.  She also has some giant platelets in the peripheral blood which is also seen in ITP.  Her platelet count is above the threshold for treatment.  I reviewed the indications for treatment in ITP which is usually any clinically significant bleeding and or platelet count less than 20,000 (arbitrarily chosen number).     As far as treating her ITP prior to an elective procedure is concerned, it depends upon the type of the procedure  and the risk of bleeding and the comfort level of the proceduralist.  Typically for an endoscopic procedure and a platelet threshold of 50,000 or above is sufficient to do it safely.  So considering that her recent blood count was more than 50,000 I would touch base with GI to see if they are comfortable with this number to proceed with a colonoscopy.  If they are not then I would recommend trialing high-dose dexamethasone to see if there is a slight platelet count.    She is agreeable to the plan.    For surveillance we could potentially consider rechecking her platelet count once every 3 to 6 months.  I will also add an immature platelet fraction to the labs.  If blood counts are stable and she is not symptomatic then we can decrease the frequency of test to once or twice a year or as clinically indicated.       Cancer Staging   No matching staging information was found for the patient.    ECOG Performance    0 - Independent         Problem List    Patient Active Problem List   Diagnosis     24 hour contact handout given     Uterine polyp     History of  section     REBECCA III (cervical intraepithelial neoplasia grade III) with severe dysplasia     Morbid obesity (H)     Idiopathic thrombocytopenic purpura (H)        Oncology history      Interval History   Daylin Amaya is a 47 year old female with chronic thrombocytopenia who is seen as a revisit to discuss further management.    She was seen by my colleague in the past for evaluation management of chronic thrombocytopenia.  It was incidentally found in the lab study.  Prior to that she had a history of superficial thrombophlebitis with occlusive and nonocclusive thrombus to the greater saphenous vein.  She had presented with leg pain and swelling.  The clot was thought to be provoked from estrogen-containing oral conceptive pills.  She was put on anticoagulation for short period of time.  She is currently off of it.     Platelet count was 82,000 in May  2022 and since then it has fluctuated up and down.  Most recently in early March it was 54,000.  Since there was no obvious secondary cause for her some cytopenia it was deemed that she probably had ITP.  Recently she had a Cologuard test which came back positive.  She is being scheduled for a possible colonoscopy and wanted our opinion regarding management of thrombocytopenia in this setting.    She does have some fatigue and minor petechiae in her feet.  But no significant bleeding anywhere.  No gum bleeds or nosebleeds.  No bright red blood per rectum.  No significant weight loss.  No enlarging lymph nodes.  No fevers or chills.  Other blood counts are within normal limits.  Hepatitis and HIV testing have been negative.  No splenomegaly on imaging.  LFTs are within normal limits.        Review of Systems  A comprehensive review of systems was negative except for what is noted in the interval history    Current Outpatient Medications   Medication     loratadine (CLARITIN) 10 MG tablet     No current facility-administered medications for this visit.        Physical Exam    No flowsheet data found.    General: alert and cooperative      Lab Results    No results found for this or any previous visit (from the past 168 hour(s)).    Imaging    US Abdomen Limited    Result Date: 3/3/2023  ULTRASOUND ABDOMEN LIMITED  3/3/2023 9:04 AM HISTORY: Thrombocytopenia. Evaluate spleen size. TECHNIQUE: Limited abdominal ultrasound to the left upper quadrant is performed. COMPARISON: None. FINDINGS: The spleen shows no focal lesions and is normal in size measuring up to 12.3 cm.      IMPRESSION:  Normal-appearing spleen.  GAVIN TRAN MD   SYSTEM ID:  [id number]        Signed by: Kendy Wright MD        Again, thank you for allowing me to participate in the care of your patient.        Sincerely,        Kendy Wright MD

## 2023-03-28 NOTE — NURSING NOTE
Is the patient currently in the state of [place]? YES    Visit mode:VIDEO    If the visit is dropped, the patient can be reconnected by: VIDEO VISIT: Text to cell phone: [phone]    Will anyone else be joining the visit? NO      How would you like to obtain your AVS? MyChart    Are changes needed to the allergy or medication list? JOCELINE Pereira  Reason for visit: Follow up

## 2023-03-28 NOTE — PROGRESS NOTES
Virtual Visit Details    Type of service:  Video Visit     Video start time: 3:36 PM  Video end time: 3:51 PM    Originating Location (pt. Location): Home    Distant Location (provider location):  Off-site  Platform used for Video Visit: [facility] Hematology [facility] On[facility] Progress Note    Patient: Daylin Amaya  MRN: [id number]  Date of Service: 03/28/2023        Reason for Visit    Chief Complaint   Patient presents with     RECHECK     Video visit          Problem List Items Addressed This Visit        Immune    Idiopathic thrombocytopenic purpura (H) - Primary         Assessment and Plan  ITP  I reviewed the diagnosis of ITP in detail with her.  I reviewed its natural course, complications and treatment options.  Her most recent platelet count from 3/7/2023 was 54,000.  She has had some fluctuations in her counts in the recent past she is actually reassuring.  No evidence of any other blood count abnormalities to suggest a primary bone marrow pathology.  I explained to her that ITP is an immune mediated phenomenon and unfortunately there is no diagnostic test for this.  It is basically a diagnosis of exclusion and if there are no other secondary or obvious reasons for thrombocytopenia then we deemed it as ITP.  She also has some giant platelets in the peripheral blood which is also seen in ITP.  Her platelet count is above the threshold for treatment.  I reviewed the indications for treatment in ITP which is usually any clinically significant bleeding and or platelet count less than 20,000 (arbitrarily chosen number).     As far as treating her ITP prior to an elective procedure is concerned, it depends upon the type of the procedure and the risk of bleeding and the comfort level of the proceduralist.  Typically for an endoscopic procedure and a platelet threshold of 50,000 or above is sufficient to do it safely.  So considering that her recent blood count was more than 50,000 I would  touch base with GI to see if they are comfortable with this number to proceed with a colonoscopy.  If they are not then I would recommend trialing high-dose dexamethasone to see if there is a slight platelet count.    She is agreeable to the plan.    For surveillance we could potentially consider rechecking her platelet count once every 3 to 6 months.  I will also add an immature platelet fraction to the labs.  If blood counts are stable and she is not symptomatic then we can decrease the frequency of test to once or twice a year or as clinically indicated.       Cancer Staging   No matching staging information was found for the patient.    ECOG Performance    0 - Independent         Problem List    Patient Active Problem List   Diagnosis     24 hour contact handout given     Uterine polyp     History of  section     REBECCA III (cervical intraepithelial neoplasia grade III) with severe dysplasia     Morbid obesity (H)     Idiopathic thrombocytopenic purpura (H)        Oncology history      Interval History   Daylin Amaya is a 47 year old female with chronic thrombocytopenia who is seen as a revisit to discuss further management.    She was seen by my colleague in the past for evaluation management of chronic thrombocytopenia.  It was incidentally found in the lab study.  Prior to that she had a history of superficial thrombophlebitis with occlusive and nonocclusive thrombus to the greater saphenous vein.  She had presented with leg pain and swelling.  The clot was thought to be provoked from estrogen-containing oral conceptive pills.  She was put on anticoagulation for short period of time.  She is currently off of it.     Platelet count was 82,000 in May 2022 and since then it has fluctuated up and down.  Most recently in early March it was 54,000.  Since there was no obvious secondary cause for her some cytopenia it was deemed that she probably had ITP.  Recently she had a Cologuard test which came back  positive.  She is being scheduled for a possible colonoscopy and wanted our opinion regarding management of thrombocytopenia in this setting.    She does have some fatigue and minor petechiae in her feet.  But no significant bleeding anywhere.  No gum bleeds or nosebleeds.  No bright red blood per rectum.  No significant weight loss.  No enlarging lymph nodes.  No fevers or chills.  Other blood counts are within normal limits.  Hepatitis and HIV testing have been negative.  No splenomegaly on imaging.  LFTs are within normal limits.        Review of Systems  A comprehensive review of systems was negative except for what is noted in the interval history    Current Outpatient Medications   Medication     loratadine (CLARITIN) 10 MG tablet     No current facility-administered medications for this visit.        Physical Exam    No flowsheet data found.    General: alert and cooperative      Lab Results    No results found for this or any previous visit (from the past 168 hour(s)).    Imaging    US Abdomen Limited    Result Date: 3/3/2023  ULTRASOUND ABDOMEN LIMITED  3/3/2023 9:04 AM HISTORY: Thrombocytopenia. Evaluate spleen size. TECHNIQUE: Limited abdominal ultrasound to the left upper quadrant is performed. COMPARISON: None. FINDINGS: The spleen shows no focal lesions and is normal in size measuring up to 12.3 cm.      IMPRESSION:  Normal-appearing spleen.  GAVIN TRAN MD   SYSTEM ID:  [id number]        Signed by: Kendy Wright MD

## 2023-03-29 ENCOUNTER — PATIENT OUTREACH (OUTPATIENT)
Dept: CARE COORDINATION | Facility: CLINIC | Age: 48
End: 2023-03-29

## 2023-03-29 NOTE — PROGRESS NOTES
"Oncology Distress Screening Follow-up  Clinical Social Work   Health    Identified Concern and Score From Distress Screenin. How concerned are you about your ability to eat?  0       2. How concerned are you about unintended weight loss or your current weight?  8 Abnormal        3. How concerned are you about feeling depressed or very sad?  0       4. How concerned are you about feeling anxious or very scared?  0       5. Do you struggle with the loss of meaning and maurice in your life?  Not at all       6. How concerned are you about work and home life issues that may be affected by your cancer?  9 Abnormal        7. How concerned are you about knowing what resources are available to help you?  0       8. Do you currently have what you would describe as Samaritan or spiritual struggles?             Not at all       You can also ask to be contacted by one of our Oncology Supportive Care professionals.    9. If you want to be contacted by one of our professionals, I can send a message to them right now.  --  No           Date of Distress Screening: 3/28/29      Data: At time of last visit, Daylin scored positive on distress screen, but also said \"no\" to outreach from a supportive care professional. SW collaborated with Faiza Medina RN who confirmed that Daylin did not need a call.      Intervention/Education Provided: NA      Follow-up Required:  will remain available as needed.    OSKAR Zurita, [facility]  Adult Oncology Clinics  [place] (M,W), [place] (T) and [facility] (Th)  *I am off Friday  Office: [phone]  "

## 2023-05-12 ENCOUNTER — TELEPHONE (OUTPATIENT)
Dept: NUTRITION | Facility: CLINIC | Age: 48
End: 2023-05-12
Payer: COMMERCIAL

## 2023-05-12 NOTE — PROGRESS NOTES
CLINICAL NUTRITION SERVICES - BRIEF NOTE    Patient was contacted by phone due to reporting concerns about unintended weight loss or current weight on the Oncology Distress Screening tool. Spoke with patient for 10 minutes regarding weight manangment. Registered Dietitian contact information provided to patient if further questions arise.    Gudelia Maguire RDN, LD  Clinical Dietitian  Office: [phone]  Weekend pager: [phone]

## 2023-05-15 ENCOUNTER — MYC MEDICAL ADVICE (OUTPATIENT)
Dept: FAMILY MEDICINE | Facility: CLINIC | Age: 48
End: 2023-05-15
Payer: COMMERCIAL

## 2023-05-15 DIAGNOSIS — Z12.11 SPECIAL SCREENING FOR MALIGNANT NEOPLASMS, COLON: Primary | ICD-10-CM

## 2023-05-15 DIAGNOSIS — D69.3 IDIOPATHIC THROMBOCYTOPENIC PURPURA (H): ICD-10-CM

## 2023-05-15 DIAGNOSIS — R19.5 POSITIVE COLORECTAL CANCER SCREENING USING COLOGUARD TEST: ICD-10-CM

## 2023-06-09 ENCOUNTER — LAB (OUTPATIENT)
Dept: LAB | Facility: CLINIC | Age: 48
End: 2023-06-09
Payer: COMMERCIAL

## 2023-06-09 DIAGNOSIS — D69.3 IDIOPATHIC THROMBOCYTOPENIC PURPURA (H): ICD-10-CM

## 2023-06-09 LAB
BASOPHILS # BLD AUTO: 0 10E3/UL (ref 0–0.2)
BASOPHILS NFR BLD AUTO: 1 %
EOSINOPHIL # BLD AUTO: 0.2 10E3/UL (ref 0–0.7)
EOSINOPHIL NFR BLD AUTO: 2 %
ERYTHROCYTE [DISTWIDTH] IN BLOOD BY AUTOMATED COUNT: 13.2 % (ref 10–15)
HCT VFR BLD AUTO: 42.3 % (ref 35–47)
HGB BLD-MCNC: 13.9 G/DL (ref 11.7–15.7)
HOLD SPECIMEN: NORMAL
IMM GRANULOCYTES # BLD: 0 10E3/UL
IMM GRANULOCYTES NFR BLD: 0 %
LYMPHOCYTES # BLD AUTO: 3.1 10E3/UL (ref 0.8–5.3)
LYMPHOCYTES NFR BLD AUTO: 36 %
MCH RBC QN AUTO: 27.8 PG (ref 26.5–33)
MCHC RBC AUTO-ENTMCNC: 32.9 G/DL (ref 31.5–36.5)
MCV RBC AUTO: 85 FL (ref 78–100)
MONOCYTES # BLD AUTO: 0.6 10E3/UL (ref 0–1.3)
MONOCYTES NFR BLD AUTO: 7 %
NEUTROPHILS # BLD AUTO: 4.6 10E3/UL (ref 1.6–8.3)
NEUTROPHILS NFR BLD AUTO: 54 %
NRBC # BLD AUTO: 0 10E3/UL
NRBC BLD AUTO-RTO: 0 /100
PLATELET # BLD AUTO: 55 10E3/UL (ref 150–450)
PLATELETS.RETICULATED NFR BLD AUTO: 11.4 % (ref 1–7)
RBC # BLD AUTO: 5 10E6/UL (ref 3.8–5.2)
WBC # BLD AUTO: 8.5 10E3/UL (ref 4–11)

## 2023-06-09 PROCEDURE — 36415 COLL VENOUS BLD VENIPUNCTURE: CPT

## 2023-06-09 PROCEDURE — 85004 AUTOMATED DIFF WBC COUNT: CPT

## 2023-06-09 PROCEDURE — 85055 RETICULATED PLATELET ASSAY: CPT

## 2023-08-31 ENCOUNTER — TELEPHONE (OUTPATIENT)
Dept: ONCOLOGY | Facility: CLINIC | Age: 48
End: 2023-08-31
Payer: COMMERCIAL

## 2023-08-31 NOTE — TELEPHONE ENCOUNTER
----- Message from Suzanne Lynch sent at 6/30/2023  3:02 PM CDT -----  Regarding: RE: follow up  I called this patient and left her a msg to call us back to set up the follow up and labs    Irais      ----- Message -----  From: Juana Medina RN  Sent: 6/21/2023   4:12 PM CDT  To: Fl Oncology   Subject: follow up                                        Please schedule patient for 3 month follow up visit with Dr. Wright in September and labs a couple days prior.   Thanks!  Faiza

## 2023-09-28 ENCOUNTER — LAB (OUTPATIENT)
Dept: LAB | Facility: CLINIC | Age: 48
End: 2023-09-28
Payer: COMMERCIAL

## 2023-09-28 DIAGNOSIS — D69.3 IDIOPATHIC THROMBOCYTOPENIC PURPURA (H): ICD-10-CM

## 2023-09-28 LAB
BASOPHILS # BLD AUTO: 0 10E3/UL (ref 0–0.2)
BASOPHILS NFR BLD AUTO: 1 %
EOSINOPHIL # BLD AUTO: 0.1 10E3/UL (ref 0–0.7)
EOSINOPHIL NFR BLD AUTO: 2 %
ERYTHROCYTE [DISTWIDTH] IN BLOOD BY AUTOMATED COUNT: 12.7 % (ref 10–15)
HCT VFR BLD AUTO: 39.9 % (ref 35–47)
HGB BLD-MCNC: 13.5 G/DL (ref 11.7–15.7)
IMM GRANULOCYTES # BLD: 0 10E3/UL
IMM GRANULOCYTES NFR BLD: 0 %
LYMPHOCYTES # BLD AUTO: 3.3 10E3/UL (ref 0.8–5.3)
LYMPHOCYTES NFR BLD AUTO: 45 %
MCH RBC QN AUTO: 28.4 PG (ref 26.5–33)
MCHC RBC AUTO-ENTMCNC: 33.8 G/DL (ref 31.5–36.5)
MCV RBC AUTO: 84 FL (ref 78–100)
MONOCYTES # BLD AUTO: 0.5 10E3/UL (ref 0–1.3)
MONOCYTES NFR BLD AUTO: 7 %
NEUTROPHILS # BLD AUTO: 3.3 10E3/UL (ref 1.6–8.3)
NEUTROPHILS NFR BLD AUTO: 45 %
NRBC # BLD AUTO: 0 10E3/UL
NRBC BLD AUTO-RTO: 0 /100
PLATELET # BLD AUTO: 66 10E3/UL (ref 150–450)
PLATELETS.RETICULATED NFR BLD AUTO: 12.8 % (ref 1–7)
RBC # BLD AUTO: 4.75 10E6/UL (ref 3.8–5.2)
WBC # BLD AUTO: 7.3 10E3/UL (ref 4–11)

## 2023-09-28 PROCEDURE — 85055 RETICULATED PLATELET ASSAY: CPT

## 2023-09-28 PROCEDURE — 85025 COMPLETE CBC W/AUTO DIFF WBC: CPT

## 2023-09-28 PROCEDURE — 36415 COLL VENOUS BLD VENIPUNCTURE: CPT

## 2023-09-29 ENCOUNTER — ONCOLOGY VISIT (OUTPATIENT)
Dept: ONCOLOGY | Facility: CLINIC | Age: 48
End: 2023-09-29
Attending: INTERNAL MEDICINE
Payer: COMMERCIAL

## 2023-09-29 VITALS
RESPIRATION RATE: 16 BRPM | BODY MASS INDEX: 41.44 KG/M2 | DIASTOLIC BLOOD PRESSURE: 79 MMHG | HEART RATE: 89 BPM | TEMPERATURE: 98.3 F | WEIGHT: 264 LBS | SYSTOLIC BLOOD PRESSURE: 140 MMHG | OXYGEN SATURATION: 99 % | HEIGHT: 67 IN

## 2023-09-29 DIAGNOSIS — D69.3 IDIOPATHIC THROMBOCYTOPENIC PURPURA (H): Primary | ICD-10-CM

## 2023-09-29 PROCEDURE — 99213 OFFICE O/P EST LOW 20 MIN: CPT | Performed by: INTERNAL MEDICINE

## 2023-09-29 PROCEDURE — 99214 OFFICE O/P EST MOD 30 MIN: CPT | Mod: Q6 | Performed by: INTERNAL MEDICINE

## 2023-09-29 ASSESSMENT — PAIN SCALES - GENERAL: PAINLEVEL: NO PAIN (0)

## 2023-09-29 NOTE — LETTER
9/29/2023         RE: Daylin Amaya  [address] [address]        Dear Colleague,    Thank you for referring your patient, Daylin Amaya, to the [facility]. Please see a copy of my visit note below.    Hematology/Medical Oncology Follow-up Note      September 29, 2023    Reason for visit:  Daylin Amaya is a 48 year old [employer]  from [place] who who presents for hematologic reevaluation of stable chronic presumptive autoimmune thrombocytopenia.    Impression:  Chronic, stable presumptive autoimmune thrombocytopenia    Recommendation, plan, instructions:  Reviewed at length and detail with the patient natural history, management and general prognosis of autoimmune thrombocytopenia.  Provided her articles from up-to-date regarding diagnosis, manifestation and initial treatment.  Follow-up CBC/differential in 6 months and I will call her back with test results.  She will call or return in event of unexplained bleeding or bruising  Anticipate face-[facility] in 1 year with CBC/differential before appointment  When she undergoes screening colonoscopy, she can undergo biopsy without any preoperative management despite her borderline low platelet count since hemostasis in the setting of autoimmune thrombocytopenia with these platelet counts is normal.  We do not consider treatment for autoimmune thrombocytopenia until the platelet count is less than 20,000-30,000.    Time with patient including review, documentation, history, examination, coordination of care and counseling was 30 minutes.    History of present illness:  In February, 2023, this patient was first evaluated here by Dr. Irving Hayes for thrombocytopenia first identified in May, 2022.  Subsequent serial platelet counts and mature platelet fractions have revealed stable thrombocytopenia around 55,000 with an increased immature platelet fraction  noted.  March, 2023 abdominal ultrasound failed to reveal splenomegaly.    Recent prior hepatitis C antibody, HIV antibody and CMP have been unremarkable.    CBC/differential and immature platelet fraction obtained before today's appointment revealed a platelet count of 66,000 with increased immature platelet fraction.    She does not smoke and uses alcohol infrequently.  She rarely takes ibuprofen.  She does not regularly use aspirin.    Past medical history:  No past medical history on file.     Family history:  I have reviewed this patient's family history and updated it with pertinent information if needed.  Family History   Problem Relation Age of Onset     Thyroid Disease Mother      Breast Cancer Mother 76     Lung Cancer Father      Thyroid Disease Sister      Pancreatic Cancer Sister 57     Stomach Cancer Maternal Grandmother      Prostate Cancer Maternal Grandfather      Allergies Son         food allergies, sees Dr Ambrocio     Ovarian Cancer Other         mom's cousin         Medications:  Current Outpatient Medications   Medication     loratadine (CLARITIN) 10 MG tablet     No current facility-administered medications for this visit.       Allergies:  Allergies   Allergen Reactions     Amoxicillin Hives     In adulthood     Sulfa Antibiotics Hives     Clindamycin Diarrhea and Rash       Review of systems:  Except as noted in the note above, the patient denies headaches, diplopia, hearing loss or dizziness; dyspnea, cough, hemoptysis, pleurisy; chest pain/pressure, palpitations, lightheadedness; anorexia, nausea, vomiting, abdominal pain, diarrhea, constipation, melena or rectal bleeding; dysuria, frequency,  blood in the urine; vaginal bleeding or discharge; fever, chills, sweats, hot flashes; tingling, numbness, loss of balance; insomnia, depression, anxiety.        Physical examination:  BP (!) 140/79 (BP Location: Right arm, Patient Position: Sitting, Cuff Size: Adult Large)   Pulse 89   Temp 98.3  F  "(36.8  C) (Tympanic)   Resp 16   Ht 1.702 m (5' 7\")   Wt 119.7 kg (264 lb)   SpO2 99%   BMI 41.35 kg/m      She is alert and oriented.  There are no ecchymoses or petechiae.    Myke Cm MD    Oncology Rooming Note    September 29, 2023 10:01 AM   Daylin Amaya is a 48 year old female who presents for:    Chief Complaint   Patient presents with     Hematology     Thrombocytopenia - provider visit only     Initial Vitals: BP (!) 140/79 (BP Location: Right arm, Patient Position: Sitting, Cuff Size: Adult Large)   Pulse 89   Temp 98.3  F (36.8  C) (Tympanic)   Resp 16   Ht 1.702 m (5' 7\")   Wt 119.7 kg (264 lb)   SpO2 99%   BMI 41.35 kg/m   Estimated body mass index is 41.35 kg/m  as calculated from the following:    Height as of this encounter: 1.702 m (5' 7\").    Weight as of this encounter: 119.7 kg (264 lb). Body surface area is 2.38 meters squared.  No Pain (0) Comment: Data Unavailable   No LMP recorded.  Allergies reviewed: Yes  Medications reviewed: Yes    Medications: Medication refills not needed today.  Pharmacy name entered into [employer]: [id number] [place] [address]    Clinical concerns:  None      Genevieve Velazquez CMA                Again, thank you for allowing me to participate in the care of your patient.        Sincerely,        Myke Cm MD  "

## 2023-09-29 NOTE — PATIENT INSTRUCTIONS
1. Follow-up CBC/diff in 6 months; Dr. Cm will call with results  2. Probable face-to-face visit in one year, but we'll see

## 2023-09-29 NOTE — PROGRESS NOTES
"Oncology Rooming Note    September 29, 2023 10:01 AM   Daylin Amaya is a 48 year old female who presents for:    Chief Complaint   Patient presents with    Hematology     Thrombocytopenia - provider visit only     Initial Vitals: BP (!) 140/79 (BP Location: Right arm, Patient Position: Sitting, Cuff Size: Adult Large)   Pulse 89   Temp 98.3  F (36.8  C) (Tympanic)   Resp 16   Ht 1.702 m (5' 7\")   Wt 119.7 kg (264 lb)   SpO2 99%   BMI 41.35 kg/m   Estimated body mass index is 41.35 kg/m  as calculated from the following:    Height as of this encounter: 1.702 m (5' 7\").    Weight as of this encounter: 119.7 kg (264 lb). Body surface area is 2.38 meters squared.  No Pain (0) Comment: Data Unavailable   No LMP recorded.  Allergies reviewed: Yes  Medications reviewed: Yes    Medications: Medication refills not needed today.  Pharmacy name entered into [employer]: [id number] [place] [address]    Clinical concerns:  None      Genevieve Velazquez, RENÉ              "

## 2024-03-29 ENCOUNTER — LAB (OUTPATIENT)
Dept: LAB | Facility: CLINIC | Age: 49
End: 2024-03-29
Payer: COMMERCIAL

## 2024-03-29 DIAGNOSIS — D69.3 IDIOPATHIC THROMBOCYTOPENIC PURPURA (H): ICD-10-CM

## 2024-03-29 LAB
BASOPHILS # BLD AUTO: 0 10E3/UL (ref 0–0.2)
BASOPHILS NFR BLD AUTO: 1 %
EOSINOPHIL # BLD AUTO: 0.2 10E3/UL (ref 0–0.7)
EOSINOPHIL NFR BLD AUTO: 3 %
ERYTHROCYTE [DISTWIDTH] IN BLOOD BY AUTOMATED COUNT: 13 % (ref 10–15)
HCT VFR BLD AUTO: 44.1 % (ref 35–47)
HGB BLD-MCNC: 14.5 G/DL (ref 11.7–15.7)
HOLD SPECIMEN: NORMAL
IMM GRANULOCYTES # BLD: 0 10E3/UL
IMM GRANULOCYTES NFR BLD: 1 %
LYMPHOCYTES # BLD AUTO: 2.7 10E3/UL (ref 0.8–5.3)
LYMPHOCYTES NFR BLD AUTO: 41 %
MCH RBC QN AUTO: 27.9 PG (ref 26.5–33)
MCHC RBC AUTO-ENTMCNC: 32.9 G/DL (ref 31.5–36.5)
MCV RBC AUTO: 85 FL (ref 78–100)
MONOCYTES # BLD AUTO: 0.3 10E3/UL (ref 0–1.3)
MONOCYTES NFR BLD AUTO: 5 %
NEUTROPHILS # BLD AUTO: 3.4 10E3/UL (ref 1.6–8.3)
NEUTROPHILS NFR BLD AUTO: 50 %
NRBC # BLD AUTO: 0 10E3/UL
NRBC BLD AUTO-RTO: 0 /100
PLATELET # BLD AUTO: 64 10E3/UL (ref 150–450)
RBC # BLD AUTO: 5.19 10E6/UL (ref 3.8–5.2)
WBC # BLD AUTO: 6.6 10E3/UL (ref 4–11)

## 2024-03-29 PROCEDURE — 36415 COLL VENOUS BLD VENIPUNCTURE: CPT

## 2024-03-29 PROCEDURE — 85025 COMPLETE CBC W/AUTO DIFF WBC: CPT

## 2024-04-05 ENCOUNTER — TELEPHONE (OUTPATIENT)
Dept: ONCOLOGY | Facility: CLINIC | Age: 49
End: 2024-04-05
Payer: COMMERCIAL

## 2024-04-05 NOTE — TELEPHONE ENCOUNTER
----- Message from Suzanne Lynch sent at 4/4/2024 10:25 AM CDT -----  Dat Laughlin I left her a msg!        Irais    ----- Message -----  From: Juana Medina RN  Sent: 3/29/2024  12:17 PM CDT  To: Fl Oncology     [facility], [facility],  Please see attached and schedule this patient for a follow up with Ebony in 6 months with lab same day.  Thanks!  Faiza  ----- Message -----  From: Myke Cm MD  Sent: 3/29/2024  11:46 AM CDT  To: JAYLEEN Nagy-    See my addendum;    Patient needs follow-up with Ebony in 6 months with CBC/diff (ordered) before appt.    William

## 2024-04-13 ENCOUNTER — HEALTH MAINTENANCE LETTER (OUTPATIENT)
Age: 49
End: 2024-04-13

## 2024-07-24 ENCOUNTER — OFFICE VISIT (OUTPATIENT)
Dept: FAMILY MEDICINE | Facility: CLINIC | Age: 49
End: 2024-07-24
Payer: COMMERCIAL

## 2024-07-24 VITALS
BODY MASS INDEX: 41.48 KG/M2 | RESPIRATION RATE: 18 BRPM | DIASTOLIC BLOOD PRESSURE: 84 MMHG | HEIGHT: 67 IN | SYSTOLIC BLOOD PRESSURE: 133 MMHG | OXYGEN SATURATION: 98 % | TEMPERATURE: 98.1 F | HEART RATE: 75 BPM | WEIGHT: 264.3 LBS

## 2024-07-24 DIAGNOSIS — E66.01 MORBID OBESITY (H): ICD-10-CM

## 2024-07-24 DIAGNOSIS — I80.02 SUPERFICIAL THROMBOPHLEBITIS OF LEFT LEG: ICD-10-CM

## 2024-07-24 DIAGNOSIS — Z13.220 SCREENING FOR LIPOID DISORDERS: ICD-10-CM

## 2024-07-24 DIAGNOSIS — D69.3 IDIOPATHIC THROMBOCYTOPENIC PURPURA (H): ICD-10-CM

## 2024-07-24 DIAGNOSIS — Z13.1 SCREENING FOR DIABETES MELLITUS: ICD-10-CM

## 2024-07-24 DIAGNOSIS — D06.9 CIN III (CERVICAL INTRAEPITHELIAL NEOPLASIA GRADE III) WITH SEVERE DYSPLASIA: ICD-10-CM

## 2024-07-24 DIAGNOSIS — R19.5 POSITIVE COLORECTAL CANCER SCREENING USING COLOGUARD TEST: ICD-10-CM

## 2024-07-24 DIAGNOSIS — Z00.00 ROUTINE GENERAL MEDICAL EXAMINATION AT A HEALTH CARE FACILITY: Primary | ICD-10-CM

## 2024-07-24 LAB
ALBUMIN SERPL BCG-MCNC: 3.8 G/DL (ref 3.5–5.2)
ALP SERPL-CCNC: 72 U/L (ref 40–150)
ALT SERPL W P-5'-P-CCNC: 16 U/L (ref 0–50)
ANION GAP SERPL CALCULATED.3IONS-SCNC: 9 MMOL/L (ref 7–15)
AST SERPL W P-5'-P-CCNC: 15 U/L (ref 0–45)
BILIRUB SERPL-MCNC: 0.4 MG/DL
BUN SERPL-MCNC: 11.6 MG/DL (ref 6–20)
CALCIUM SERPL-MCNC: 9.2 MG/DL (ref 8.8–10.4)
CHLORIDE SERPL-SCNC: 107 MMOL/L (ref 98–107)
CHOLEST SERPL-MCNC: 171 MG/DL
CREAT SERPL-MCNC: 0.8 MG/DL (ref 0.51–0.95)
EGFRCR SERPLBLD CKD-EPI 2021: 90 ML/MIN/1.73M2
FASTING STATUS PATIENT QL REPORTED: YES
FASTING STATUS PATIENT QL REPORTED: YES
GLUCOSE SERPL-MCNC: 100 MG/DL (ref 70–99)
HBA1C MFR BLD: 5.7 % (ref 0–5.6)
HCO3 SERPL-SCNC: 24 MMOL/L (ref 22–29)
HDLC SERPL-MCNC: 62 MG/DL
INSULIN SERPL-ACNC: 33.7 UU/ML (ref 2.6–24.9)
LDLC SERPL CALC-MCNC: 85 MG/DL
NONHDLC SERPL-MCNC: 109 MG/DL
POTASSIUM SERPL-SCNC: 4.1 MMOL/L (ref 3.4–5.3)
PROT SERPL-MCNC: 6.6 G/DL (ref 6.4–8.3)
SODIUM SERPL-SCNC: 140 MMOL/L (ref 135–145)
TRIGL SERPL-MCNC: 119 MG/DL
TSH SERPL DL<=0.005 MIU/L-ACNC: 3 UIU/ML (ref 0.3–4.2)
VIT D+METAB SERPL-MCNC: 40 NG/ML (ref 20–50)

## 2024-07-24 PROCEDURE — 99396 PREV VISIT EST AGE 40-64: CPT | Performed by: PHYSICIAN ASSISTANT

## 2024-07-24 PROCEDURE — 80053 COMPREHEN METABOLIC PANEL: CPT | Performed by: PHYSICIAN ASSISTANT

## 2024-07-24 PROCEDURE — 82306 VITAMIN D 25 HYDROXY: CPT | Performed by: PHYSICIAN ASSISTANT

## 2024-07-24 PROCEDURE — 83525 ASSAY OF INSULIN: CPT | Performed by: PHYSICIAN ASSISTANT

## 2024-07-24 PROCEDURE — 36415 COLL VENOUS BLD VENIPUNCTURE: CPT | Performed by: PHYSICIAN ASSISTANT

## 2024-07-24 PROCEDURE — 84443 ASSAY THYROID STIM HORMONE: CPT | Performed by: PHYSICIAN ASSISTANT

## 2024-07-24 PROCEDURE — 80061 LIPID PANEL: CPT | Performed by: PHYSICIAN ASSISTANT

## 2024-07-24 PROCEDURE — 99214 OFFICE O/P EST MOD 30 MIN: CPT | Mod: 25 | Performed by: PHYSICIAN ASSISTANT

## 2024-07-24 PROCEDURE — 83036 HEMOGLOBIN GLYCOSYLATED A1C: CPT | Performed by: PHYSICIAN ASSISTANT

## 2024-07-24 PROCEDURE — 93000 ELECTROCARDIOGRAM COMPLETE: CPT | Performed by: PHYSICIAN ASSISTANT

## 2024-07-24 RX ORDER — PHENTERMINE HYDROCHLORIDE 15 MG/1
15 CAPSULE ORAL EVERY MORNING
Qty: 30 CAPSULE | Refills: 0 | Status: SHIPPED | OUTPATIENT
Start: 2024-07-24 | End: 2024-07-26

## 2024-07-24 SDOH — HEALTH STABILITY: PHYSICAL HEALTH: ON AVERAGE, HOW MANY DAYS PER WEEK DO YOU ENGAGE IN MODERATE TO STRENUOUS EXERCISE (LIKE A BRISK WALK)?: 2 DAYS

## 2024-07-24 ASSESSMENT — SOCIAL DETERMINANTS OF HEALTH (SDOH): HOW OFTEN DO YOU GET TOGETHER WITH FRIENDS OR RELATIVES?: ONCE A WEEK

## 2024-07-24 ASSESSMENT — PAIN SCALES - GENERAL: PAINLEVEL: NO PAIN (0)

## 2024-07-24 NOTE — PATIENT INSTRUCTIONS
Contact your OBGYN regarding if you need to continue PAP smears    Follow up in 1 month    More information on medications:  GLP1 agonists such as wegovy, saxenda, zepbound. These are injectable. There is currently an issue with shortages but this should improve over time. One of the ways it works is by slowing down the rate that food leaves your stomach. You feel eid and will eat less.  It also helps regulate hormones that can help improve your blood sugars.    Phentermine which is an appetite suppressant and stimulant, improves carbohydrate cravings, increases satiety, very old drug that works pretty well and is pretty well tolerated.  Common side effects include constipation, diarrhea, dry mouth, difficulty sleeping and overstimulation and can affect blood pressure and heart rate.    Bupropion/naltrexone: Decreases hunger, controls cravings, works on the weight control center of your brain.  Side effects include nausea, headache, insomnia dizziness, dry mouth increase blood pressure and heart rate, sometimes some mental health issues.  However this because bupropion is a antidepressant sometimes does help with depression to.    Phentermine/topiramate.  Decreased hunger decreased food consumption decreased appetite and cravings and increases satiety.  Side effects include dry mouth kidney stone formation restlessness insomnia heart palpitations increased heart rate headache constipation    These drugs are older and so are much more inexpensive.      Patient Education   Preventive Care Advice   This is general advice given by our system to help you stay healthy. However, your care team may have specific advice just for you. Please talk to your care team about your preventive care needs.  Nutrition  Eat 5 or more servings of fruits and vegetables each day.  Try wheat bread, brown rice and whole grain pasta (instead of white bread, rice, and pasta).  Get enough calcium and vitamin D. Check the label on foods and  aim for 100% of the RDA (recommended daily allowance).  Lifestyle  Exercise at least 150 minutes each week  (30 minutes a day, 5 days a week).  Do muscle strengthening activities 2 days a week. These help control your weight and prevent disease.  No smoking.  Wear sunscreen to prevent skin cancer.  Have a dental exam and cleaning every 6 months.  Yearly exams  See your health care team every year to talk about:  Any changes in your health.  Any medicines your care team has prescribed.  Preventive care, family planning, and ways to prevent chronic diseases.  Shots (vaccines)   HPV shots (up to age 26), if you've never had them before.  Hepatitis B shots (up to age 59), if you've never had them before.  COVID-19 shot: Get this shot when it's due.  Flu shot: Get a flu shot every year.  Tetanus shot: Get a tetanus shot every 10 years.  Pneumococcal, hepatitis A, and RSV shots: Ask your care team if you need these based on your risk.  Shingles shot (for age 50 and up)  General health tests  Diabetes screening:  Starting at age 35, Get screened for diabetes at least every 3 years.  If you are younger than age 35, ask your care team if you should be screened for diabetes.  Cholesterol test: At age 39, start having a cholesterol test every 5 years, or more often if advised.  Bone density scan (DEXA): At age 50, ask your care team if you should have this scan for osteoporosis (brittle bones).  Hepatitis C: Get tested at least once in your life.  STIs (sexually transmitted infections)  Before age 24: Ask your care team if you should be screened for STIs.  After age 24: Get screened for STIs if you're at risk. You are at risk for STIs (including HIV) if:  You are sexually active with more than one person.  You don't use condoms every time.  You or a partner was diagnosed with a sexually transmitted infection.  If you are at risk for HIV, ask about PrEP medicine to prevent HIV.  Get tested for HIV at least once in your life,  whether you are at risk for HIV or not.  Cancer screening tests  Cervical cancer screening: If you have a cervix, begin getting regular cervical cancer screening tests starting at age 21.  Breast cancer scan (mammogram): If you've ever had breasts, begin having regular mammograms starting at age 40. This is a scan to check for breast cancer.  Colon cancer screening: It is important to start screening for colon cancer at age 45.  Have a colonoscopy test every 10 years (or more often if you're at risk) Or, ask your provider about stool tests like a FIT test every year or Cologuard test every 3 years.  To learn more about your testing options, visit:   .  For help making a decision, visit:   https://bit.ly/bw85984.  Prostate cancer screening test: If you have a prostate, ask your care team if a prostate cancer screening test (PSA) at age 55 is right for you.  Lung cancer screening: If you are a current or former smoker ages 50 to 80, ask your care team if ongoing lung cancer screenings are right for you.  For informational purposes only. Not to replace the advice of your health care provider. Copyright   2023 [employer]. All rights reserved. Clinically reviewed by the [facility] Transitions Program. Ravti [id number] - REV 01/24.

## 2024-07-24 NOTE — PROGRESS NOTES
"Preventive Care Visit  [facility] DAMARIS Jackson PA-C, Family Medicine  2024      Assessment & Plan     Routine general medical examination at a health care facility  Discussed routine/preventive healthcare and lifestyle measures.    Morbid obesity (H)  1 month follow up scheduled. Will repeat EKG at that time.  We spent much of our clinic visit today discussing weight loss and the use of medications in weight loss. Specifically we discussed the followin. Obesity is heterogenic meaning that the cause (and therefore the treatment) is very different person to person. It's also important to realize that the same prevention strategies for obesity (eat less and move more) are much different than the actual treatment of obesity.   2. Medications used for weight loss work in different ways but the ultimate goal is to lower a patient's \"setpoint\" weight. This means it IS NOT an alternative to eating healthy and exercising and in fact this needs to continue to be a very KEY part of the weight loss regimen.   3. At best these medications will only contribute to a small percentage of weight loss so it is unrealistic to expect that medications alone will be enough if the amount needed to lose is high. For example, qsymia has shown to cause ~10% weight reduction (on average 27lb) and contrave a ~4.5% weight reduction (13.7lb). But if this is enough to improve quality of life so that exercise becomes easier because joints hurt less, or blood pressure or diabetes improve, then they can be very helpful in starting the weight loss process  4. These medications require close monitoring and it is still not clear if these are medications that will be needed \"for life.\" Some studies have shown that once these medications are stopped, the \"setpoint\" weight which was lowered while taking the medications does seem to increase back to the original.   5. Although the safety profiles of these medications " is fairly well understood, it is still something that needs to be discussed frequently during follow up visits. Also, if at 3 months we are not seeing the expected weight loss, then it is likely not worth continuing. However, failing or not responding to one medication does not mean that another might not work.      After a thorough discussion of the medications, we agreed to start with phentermine.   We discussed risks and benefits of phentermine.  Discussed short term vs long term use, not FDA approved for long term use, this would be off label.  Other medications approved for long term use were discussed, patient is aware: such as GLP1 agonists, naltrexone/bupropion, phentermine/topiramate ER.  Patient has no history of CVD, no psychiatric/substance abuse history. Baseline EKG today/  Patient is not pregnant and has pregnancy prevention plan in place.  Will ensure no clinically significant increase in blood pressure or pulse while taking phentermine.  Will ensure demonstration of significant weight loss of 5-10% with phentermine at 12 weeks at maximum dose.  Will start at low dose  5 mg daily, escalate as needed to promote weight loss. Recommend monthly monitoring after escalation.    - TSH with free T4 reflex; Future  - Vitamin D Deficiency; Future  - EKG 12-lead complete w/read - Clinics  - phentermine 15 MG capsule; Take 1 capsule (15 mg) by mouth every morning  - TSH with free T4 reflex  - Vitamin D Deficiency    Positive colorectal cancer screening using Cologuard test  Needs colonoscopy. Recommended recheck platelets prior to colonoscopy.  Per oncology note 9/2023:  When she undergoes screening colonoscopy, she can undergo biopsy without any preoperative management despite her borderline low platelet count since hemostasis in the setting of autoimmune thrombocytopenia with these platelet counts is normal.   - Colonoscopy Screening  Referral; Future    Idiopathic thrombocytopenic purpura (H)  Due  "for follow up this autumn with oncology with labwork.    REBECCA III (cervical intraepithelial neoplasia grade III) with severe dysplasia  I reached out to PAP pool RN, here are recommendations:   The 2019 ASCCP guidelines recommend screening for 25 years post treatment for REBECCA 3, even if a hyst is performed. The patient should have another cotest now and then every 3 years until 2030. Here is her history:     9/2005 LEEP REBECCA III, ectocervical Margin positive   2007, 2009, 2010, 2012   10/25/16 NIL pap, neg HPV   7/28/20 NIL pap, neg HPV   10/3/22 Hyst - cervix benign \  Hysterectomy in 2022 due to menorrhagia/fibroid.   Will perform  PAP smear at next visit.    Superficial thrombophlebitis of left leg  Last saw vascular 3/2023 with note: Will reevaluate for RFA ablation after completion of hematology workup and improvement in her platelet count.   Follow up PRN.    Screening for diabetes mellitus  History of gestational diabetes. Screen for prediabetes and insulin resistance.  - Hemoglobin A1c; Future  - Comprehensive metabolic panel (BMP + Alb, Alk Phos, ALT, AST, Total. Bili, TP); Future  - Insulin level; Future  - Hemoglobin A1c  - Comprehensive metabolic panel (BMP + Alb, Alk Phos, ALT, AST, Total. Bili, TP)  - Insulin level    Screening for lipoid disorders  - Lipid panel reflex to direct LDL Fasting; Future  - Lipid panel reflex to direct LDL Fasting      BMI  Estimated body mass index is 41.4 kg/m  as calculated from the following:    Height as of this encounter: 1.702 m (5' 7\").    Weight as of this encounter: 119.9 kg (264 lb 4.8 oz).   Weight management plan: Discussed healthy diet and exercise guidelines medication start as well    Counseling  Appropriate preventive services were addressed with this patient via screening, questionnaire, or discussion as appropriate for fall prevention, nutrition, physical activity, Tobacco-use cessation, weight loss and cognition.  Checklist reviewing preventive services " available has been given to the patient.  Reviewed patient's diet, addressing concerns and/or questions.   She is at risk for lack of exercise and has been provided with information to increase physical activity for the benefit of her well-being.   She is at risk for psychosocial distress and has been provided with information to reduce risk.     Dany Mao is a 48 year old, presenting for the following:  Physical        7/24/2024    11:01 AM   Additional Questions   Roomed by Joanna Rey CMA        Health Care Directive  Patient does not have a Health Care Directive or Living Will: Discussed advance care planning with patient; information given to patient to review.    HPI    -She is also wanting to discuss weight loss medication      Works from home. Mindless eating. Pretty healthy meals. Working on increasing protein. Stressful job, busy with kids sports.  Likes walking, swimming, biking. Hard to make time for it  Has lost weight in the past with counting carbs        7/24/2024   General Health   How would you rate your overall physical health? Good   Feel stress (tense, anxious, or unable to sleep) Only a little      (!) STRESS CONCERN      7/24/2024   Nutrition   Three or more servings of calcium each day? Yes   Diet: Regular (no restrictions)   How many servings of fruit and vegetables per day? (!) 2-3   How many sweetened beverages each day? 0-1            7/24/2024   Exercise   Days per week of moderate/strenous exercise 2 days      (!) EXERCISE CONCERN      7/24/2024   Social Factors   Frequency of gathering with friends or relatives Once a week   Worry food won't last until get money to buy more No   Food not last or not have enough money for food? No   Do you have housing? (Housing is defined as stable permanent housing and does not include staying ouside in a car, in a tent, in an abandoned building, in an overnight shelter, or couch-surfing.) Yes   Are you worried about losing your housing?  No   Lack of transportation? No   Unable to get utilities (heat,electricity)? No            7/24/2024   Dental   Dentist two times every year? Yes            7/24/2024   TB Screening   Were you born outside of the [place]? No      Today's PHQ-2 Score:       7/24/2024    10:34 AM   PHQ-2 ( 1999 [employer])   Q1: Little interest or pleasure in doing things 1   Q2: Feeling down, depressed or hopeless 0   PHQ-2 Score 1   Q1: Little interest or pleasure in doing things Several days   Q2: Feeling down, depressed or hopeless Not at all   PHQ-2 Score 1           7/24/2024   Substance Use   Alcohol more than 3/day or more than 7/wk No   Do you use any other substances recreationally? No        Social History     Tobacco Use    Smoking status: Never    Smokeless tobacco: Never   Vaping Use    Vaping status: Never Used   Substance Use Topics    Alcohol use: No    Drug use: No           1/30/2023   LAST FHS-7 RESULTS   1st degree relative breast or ovarian cancer Yes   Any relative bilateral breast cancer No   Any male have breast cancer No   Any ONE woman have BOTH breast AND ovarian cancer No   Any woman with breast cancer before 50yrs No   2 or more relatives with breast AND/OR ovarian cancer Yes   2 or more relatives with breast AND/OR bowel cancer No         Mammogram Screening - Mammogram every 1-2 years updated in Health Maintenance based on mutual decision making        7/24/2024   STI Screening   New sexual partner(s) since last STI/HIV test? No        History of abnormal Pap smear:   9/2005 LEEP: REBECCA III, ectocervical Margin positive   ASCCP recommends: History of CIN2 - CIN3: Pap and HPV every 3 years for 20 years.   Plan: Pap/HPV due 07/2023   However - No precancerous indications for hysterectomy.  Hysterectomy in 2022 due to menorrhagia/fibroid.         7/28/2020     2:29 PM   PAP / HPV   PAP-ABSTRACT See Scanned Document           This result is from an external source.     ASCVD Risk   The 10-year ASCVD risk  score (Sanjeev LEMA, et al., 2019) is: 0.7%    Values used to calculate the score:      Age: 48 years      Sex: Female      Is Non- : No      Diabetic: No      Tobacco smoker: No      Systolic Blood Pressure: 138 mmHg      Is BP treated: No      HDL Cholesterol: 62 mg/dL      Total Cholesterol: 162 mg/dL       Reviewed and updated as needed this visit by Provider       Med Hx   Fam Hx            Past Medical History:   Diagnosis Date    Gestational diabetes      Past Surgical History:   Procedure Laterality Date    HYSTERECTOMY  10/03/2022    vaginal hysterectomy. still has ovaries.     Lab work is in process  Labs reviewed in EPIC  BP Readings from Last 3 Encounters:   24 133/84   23 (!) 140/79   23 133/85    Wt Readings from Last 3 Encounters:   24 119.9 kg (264 lb 4.8 oz)   23 119.7 kg (264 lb)   23 120.2 kg (265 lb 1.6 oz)                  Patient Active Problem List   Diagnosis    24 hour contact handout given    Uterine polyp    History of  section    REBECCA III (cervical intraepithelial neoplasia grade III) with severe dysplasia    Morbid obesity (H)    Idiopathic thrombocytopenic purpura (H)     Past Surgical History:   Procedure Laterality Date    HYSTERECTOMY  10/03/2022    vaginal hysterectomy. still has ovaries.       Social History     Tobacco Use    Smoking status: Never    Smokeless tobacco: Never   Substance Use Topics    Alcohol use: No     Family History   Problem Relation Age of Onset    Thyroid Disease Mother     Breast Cancer Mother 76    Lung Cancer Father     Stomach Cancer Sister 57        STAGE 4    Thyroid Disease Sister     Stomach Cancer Maternal Grandmother     Prostate Cancer Maternal Grandfather     Allergies Son         food allergies, sees Dr Ambrocio    Ovarian Cancer Other         mom's cousin             Review of Systems  CONSTITUTIONAL: NEGATIVE for fever, chills, change in weight  INTEGUMENTARY/SKIN: NEGATIVE for  "worrisome rashes, moles or lesions  EYES: NEGATIVE for vision changes or irritation  ENT/MOUTH: NEGATIVE for ear, mouth and throat problems  RESP: NEGATIVE for significant cough or SOB  BREAST: NEGATIVE for masses, tenderness or discharge  CV: NEGATIVE for chest pain, palpitations or peripheral edema  GI: NEGATIVE for nausea, abdominal pain, heartburn, or change in bowel habits  : NEGATIVE for frequency, dysuria, or hematuria  MUSCULOSKELETAL: NEGATIVE for significant arthralgias or myalgia  NEURO: NEGATIVE for weakness, dizziness or paresthesias  ENDOCRINE: NEGATIVE for temperature intolerance, skin/hair changes  HEME: NEGATIVE for bleeding problems  PSYCHIATRIC: NEGATIVE for changes in mood or affect     Objective    Exam  /86   Pulse 75   Temp 98.1  F (36.7  C) (Tympanic)   Resp 18   Ht 1.702 m (5' 7\")   Wt 119.9 kg (264 lb 4.8 oz)   LMP 12/15/2017 (Approximate)   SpO2 98%   BMI 41.40 kg/m     Estimated body mass index is 41.4 kg/m  as calculated from the following:    Height as of this encounter: 1.702 m (5' 7\").    Weight as of this encounter: 119.9 kg (264 lb 4.8 oz).    Physical Exam  GENERAL: alert and no distress  EYES: Eyes grossly normal to inspection, PERRL and conjunctivae and sclerae normal  HENT: ear canals and TM's normal, nose and mouth without ulcers or lesions  NECK: no adenopathy, no asymmetry, masses, or scars  RESP: lungs clear to auscultation - no rales, rhonchi or wheezes  BREAST: normal without masses, tenderness or nipple discharge and no palpable axillary masses or adenopathy  BREAST: normal without masses, tenderness or nipple discharge, no palpable axillary masses or adenopathy, and POSITIVE symmetric dense breast tissue lower breasts bilaterally   CV: regular rate and rhythm, normal S1 S2, no S3 or S4, no murmur, click or rub, no peripheral edema  ABDOMEN: soft, nontender, no hepatosplenomegaly, no masses and bowel sounds normal  MS: no gross musculoskeletal defects " noted, no edema  SKIN: no suspicious lesions or rashes  NEURO: Normal strength and tone, mentation intact and speech normal  PSYCH: mentation appears normal, affect normal/bright    EKG - appears normal, NSR, normal axis, normal intervals, no acute ST/T changes c/w ischemia, no LVH by voltage criteria, there are no prior tracings available  I personally read, reviewed, and advised patient of EKG results.       Signed Electronically by: Tamar Jackson PA-C

## 2024-07-26 ENCOUNTER — TELEPHONE (OUTPATIENT)
Dept: FAMILY MEDICINE | Facility: CLINIC | Age: 49
End: 2024-07-26
Payer: COMMERCIAL

## 2024-07-26 DIAGNOSIS — E66.01 MORBID OBESITY (H): ICD-10-CM

## 2024-07-26 RX ORDER — PHENTERMINE HYDROCHLORIDE 15 MG/1
15 CAPSULE ORAL EVERY MORNING
Qty: 30 CAPSULE | Refills: 0 | Status: SHIPPED | OUTPATIENT
Start: 2024-07-26 | End: 2024-08-23

## 2024-07-26 NOTE — TELEPHONE ENCOUNTER
Medication Question or Refill    Contacts       Contact Date/Time Type Contact Phone/Fax    07/26/2024 08:33 AM CDT Phone (Incoming) Daylin Amaya (Self) [phone] (M)            What medication are you calling about (include dose and sig)?:   phentermine 15 MG capsule  15 mg, EVERY MORNING           Summary: Take 1 capsule (15 mg) by mouth every morning, Disp-30 capsule, R-0, E-Prescribe  Dose, Route, Frequency: 15 mg, Oral, EVERY MORNINGStart: 07/24/2024Ord/Sold: 07/24/2024          The original pharmacy that this med was called in to has been closed unexpectedly so patient needs this called to the [facility] instead.    Preferred Pharmacy:   [facility] [address] [facility] [employer] - [place] - [address]  [address]  [place] [address]  Phone: [phone] Fax: [phone]      Controlled Substance Agreement on file:   CSA -- Patient Level:    CSA: None found at the patient level.       Who prescribed the medication?: Manuel    Do you need a refill? Yes      [facility] or would you prefer to receive a phone call?:   Patient would prefer a phone call   Okay to leave a detailed message?: Yes at Cell number on file:    Telephone Information:   Mobile [phone]     Kiki Newell on 7/26/2024 at 8:37 AM

## 2024-08-19 NOTE — PROVIDER NOTIFICATION
08/19/24 [id number]   General Information   Contact made? Yes     8/19: Pt reports chronic low platelet level. Past two results in 60's but nothing drawn recently. Pt concerned she will do prep and be cancelled. Per dr Nails, if platelet level is above 60 there is nothing that needs to be done. If platelet level is <60, MD may cancel case or have patient arrive earlier then normal arrival time and give platelets before procedure. Pt aware and reports she is seeing MD and will discuss blood draw >1week before procedure (that way she will not prep and then be cancelled).

## 2024-08-23 ENCOUNTER — OFFICE VISIT (OUTPATIENT)
Dept: FAMILY MEDICINE | Facility: CLINIC | Age: 49
End: 2024-08-23
Payer: COMMERCIAL

## 2024-08-23 VITALS
HEIGHT: 66 IN | HEART RATE: 84 BPM | WEIGHT: 257.7 LBS | BODY MASS INDEX: 41.42 KG/M2 | SYSTOLIC BLOOD PRESSURE: 130 MMHG | DIASTOLIC BLOOD PRESSURE: 82 MMHG | OXYGEN SATURATION: 98 % | RESPIRATION RATE: 18 BRPM | TEMPERATURE: 98.6 F

## 2024-08-23 DIAGNOSIS — D69.3 IDIOPATHIC THROMBOCYTOPENIC PURPURA (H): ICD-10-CM

## 2024-08-23 DIAGNOSIS — E66.01 MORBID OBESITY (H): Primary | ICD-10-CM

## 2024-08-23 DIAGNOSIS — Z12.4 CERVICAL CANCER SCREENING: ICD-10-CM

## 2024-08-23 PROCEDURE — G0145 SCR C/V CYTO,THINLAYER,RESCR: HCPCS | Performed by: PHYSICIAN ASSISTANT

## 2024-08-23 PROCEDURE — 99214 OFFICE O/P EST MOD 30 MIN: CPT | Performed by: PHYSICIAN ASSISTANT

## 2024-08-23 PROCEDURE — 87624 HPV HI-RISK TYP POOLED RSLT: CPT | Performed by: PHYSICIAN ASSISTANT

## 2024-08-23 PROCEDURE — 93000 ELECTROCARDIOGRAM COMPLETE: CPT | Performed by: PHYSICIAN ASSISTANT

## 2024-08-23 RX ORDER — PHENTERMINE HYDROCHLORIDE 15 MG/1
15 CAPSULE ORAL EVERY MORNING
Qty: 90 CAPSULE | Refills: 0 | Status: SHIPPED | OUTPATIENT
Start: 2024-08-23

## 2024-08-23 ASSESSMENT — PAIN SCALES - GENERAL: PAINLEVEL: NO PAIN (0)

## 2024-08-23 NOTE — PATIENT INSTRUCTIONS
Follow up with me in 3 months, sooner if needed    Continue phentermine 15 mg daily    For colonoscopy:  Additional Medication Instructions   - phentermine: DO NOT TAKE 7 days prior to surgery.     Check CBC prior to colonoscopy    Schedule follow up with hematology: [phone].

## 2024-08-23 NOTE — PROGRESS NOTES
Assessment & Plan     Morbid obesity (H)  Doing well. No side effects (such as hypertension, palpitations, insomnia, anxiety, chest pain). Will continue 15 mg, follow up in 3 months for recheck.  Stop 7 days prior to colonoscopy. Handout given.  No QT prolongation on EKG.  - EKG 12-lead complete w/read - Clinics  - phentermine 15 MG capsule; Take 1 capsule (15 mg) by mouth every morning.    Idiopathic thrombocytopenic purpura (H)  Check CBC prior to colonoscopy.  - CBC with platelets and differential; Future    Cervical cancer screening  - HPV and Gynecologic Cytology Panel - Recommended Age 30 - 65 Years      Wt Readings from Last 4 Encounters:   08/23/24 116.9 kg (257 lb 11.2 oz)   07/24/24 119.9 kg (264 lb 4.8 oz)   09/29/23 119.7 kg (264 lb)   01/30/23 120.2 kg (265 lb 1.6 oz)       Dany Mao is a 48 year old, presenting for the following health issues:  Weight Problem (One month follow up on weight loss medication. ), Health Maintenance (Her colonoscopy is scheduled for 9-27-24.), and Imm/Inj (Mentioned about the Covid injection and updating in the Fall.)        8/23/2024     9:20 AM   Additional Questions   Roomed by Jael Bradley CMA     History of Present Illness       Reason for visit:  Follow up for new meds prescribed    She eats 2-3 servings of fruits and vegetables daily.She consumes 0 sweetened beverage(s) daily.She exercises with enough effort to increase her heart rate 20 to 29 minutes per day.  She exercises with enough effort to increase her heart rate 5 days per week.   She is taking medications regularly.       Chief Complaint   Patient presents with    Weight Problem     One month follow up on weight loss medication.     Health Maintenance     Her colonoscopy is scheduled for 9-27-24.    Imm/Inj     Mentioned about the Covid injection and updating in the Fall.       Medication Followup of Phentermine  Taking Medication as prescribed: yes, she has been on this medication for 3 weeks  "now due to issues with her Pharmacy.  Side Effects:  None  Medication Helping Symptoms:  yes    \"Really slowed down the noise chatter\"  Reducing cravings, in particular when busy at work. Able to make better choices. Lower portion size.    Other than noted above, general, HEENT, respiratory, cardiac, MS, and gastrointestinal systems are negative.       Objective    /86 (BP Location: Right arm, Patient Position: Chair, Cuff Size: Adult Large)   Pulse 84   Temp 98.6  F (37  C) (Tympanic)   Resp 18   Ht 1.683 m (5' 6.25\")   Wt 116.9 kg (257 lb 11.2 oz)   LMP 12/15/2017 (Approximate)   SpO2 98%   BMI 41.28 kg/m    Body mass index is 41.28 kg/m .  Physical Exam   GENERAL: alert and no distress  RESP: lungs clear to auscultation - no rales, rhonchi or wheezes  CV: regular rate and rhythm, normal S1 S2, no S3 or S4, no murmur, click or rub, no peripheral edema  MS: no gross musculoskeletal defects noted, no edema  PSYCH: mentation appears normal, affect normal/bright    EKG - Reviewed and interpreted by me appears normal, NSR, normal axis, normal intervals, no acute ST/T changes c/w ischemia, no LVH by voltage criteria, unchanged from previous tracings        Signed Electronically by: Tamar Jackson PA-C    "

## 2024-08-26 LAB
HPV HR 12 DNA CVX QL NAA+PROBE: NEGATIVE
HPV16 DNA CVX QL NAA+PROBE: NEGATIVE
HPV18 DNA CVX QL NAA+PROBE: NEGATIVE
HUMAN PAPILLOMA VIRUS FINAL DIAGNOSIS: NORMAL

## 2024-08-29 LAB
BKR AP ASSOCIATED HPV REPORT: NORMAL
BKR LAB AP GYN ADEQUACY: NORMAL
BKR LAB AP GYN INTERPRETATION: NORMAL
BKR LAB AP PREVIOUS ABNORMAL: NORMAL
PATH REPORT.COMMENTS IMP SPEC: NORMAL
PATH REPORT.COMMENTS IMP SPEC: NORMAL
PATH REPORT.RELEVANT HX SPEC: NORMAL

## 2024-09-25 ENCOUNTER — LAB (OUTPATIENT)
Dept: LAB | Facility: CLINIC | Age: 49
End: 2024-09-25
Payer: COMMERCIAL

## 2024-09-25 DIAGNOSIS — D69.3 IDIOPATHIC THROMBOCYTOPENIC PURPURA (H): ICD-10-CM

## 2024-09-25 LAB
BASOPHILS # BLD AUTO: 0 10E3/UL (ref 0–0.2)
BASOPHILS NFR BLD AUTO: 1 %
EOSINOPHIL # BLD AUTO: 0.2 10E3/UL (ref 0–0.7)
EOSINOPHIL NFR BLD AUTO: 2 %
ERYTHROCYTE [DISTWIDTH] IN BLOOD BY AUTOMATED COUNT: 12.8 % (ref 10–15)
HCT VFR BLD AUTO: 41.6 % (ref 35–47)
HGB BLD-MCNC: 14 G/DL (ref 11.7–15.7)
HOLD SPECIMEN: NORMAL
IMM GRANULOCYTES # BLD: 0 10E3/UL
IMM GRANULOCYTES NFR BLD: 1 %
LYMPHOCYTES # BLD AUTO: 2.8 10E3/UL (ref 0.8–5.3)
LYMPHOCYTES NFR BLD AUTO: 43 %
MCH RBC QN AUTO: 28.7 PG (ref 26.5–33)
MCHC RBC AUTO-ENTMCNC: 33.7 G/DL (ref 31.5–36.5)
MCV RBC AUTO: 85 FL (ref 78–100)
MONOCYTES # BLD AUTO: 0.5 10E3/UL (ref 0–1.3)
MONOCYTES NFR BLD AUTO: 7 %
NEUTROPHILS # BLD AUTO: 3 10E3/UL (ref 1.6–8.3)
NEUTROPHILS NFR BLD AUTO: 46 %
NRBC # BLD AUTO: 0 10E3/UL
NRBC BLD AUTO-RTO: 0 /100
PLATELET # BLD AUTO: 57 10E3/UL (ref 150–450)
RBC # BLD AUTO: 4.88 10E6/UL (ref 3.8–5.2)
WBC # BLD AUTO: 6.5 10E3/UL (ref 4–11)

## 2024-09-25 PROCEDURE — 86900 BLOOD TYPING SEROLOGIC ABO: CPT

## 2024-09-25 PROCEDURE — 85048 AUTOMATED LEUKOCYTE COUNT: CPT

## 2024-09-25 PROCEDURE — 36415 COLL VENOUS BLD VENIPUNCTURE: CPT

## 2024-09-25 NOTE — OR NURSING
Plt count 57. Pt will reach out to hematologist and verify ok to proceed with colonoscopy on Friday.

## 2024-09-26 ENCOUNTER — ANESTHESIA EVENT (OUTPATIENT)
Dept: GASTROENTEROLOGY | Facility: CLINIC | Age: 49
End: 2024-09-26
Payer: COMMERCIAL

## 2024-09-26 ENCOUNTER — VIRTUAL VISIT (OUTPATIENT)
Dept: ONCOLOGY | Facility: HOSPITAL | Age: 49
End: 2024-09-26
Attending: INTERNAL MEDICINE
Payer: COMMERCIAL

## 2024-09-26 ENCOUNTER — APPOINTMENT (OUTPATIENT)
Dept: LAB | Facility: CLINIC | Age: 49
End: 2024-09-26
Payer: COMMERCIAL

## 2024-09-26 VITALS — HEIGHT: 67 IN | WEIGHT: 254 LBS | BODY MASS INDEX: 39.87 KG/M2

## 2024-09-26 DIAGNOSIS — D69.3 IDIOPATHIC THROMBOCYTOPENIC PURPURA (H): Primary | ICD-10-CM

## 2024-09-26 LAB
ABO/RH(D): NORMAL
ABO/RH(D): NORMAL
ANTIBODY SCREEN: NEGATIVE
BASOPHILS # BLD AUTO: 0 10E3/UL (ref 0–0.2)
BASOPHILS NFR BLD AUTO: 1 %
BLD PROD TYP BPU: NORMAL
BLOOD COMPONENT TYPE: NORMAL
CODING SYSTEM: NORMAL
EOSINOPHIL # BLD AUTO: 0.1 10E3/UL (ref 0–0.7)
EOSINOPHIL NFR BLD AUTO: 2 %
ERYTHROCYTE [DISTWIDTH] IN BLOOD BY AUTOMATED COUNT: 12.8 % (ref 10–15)
HCT VFR BLD AUTO: 41.4 % (ref 35–47)
HGB BLD-MCNC: 14.1 G/DL (ref 11.7–15.7)
IMM GRANULOCYTES # BLD: 0 10E3/UL
IMM GRANULOCYTES NFR BLD: 0 %
ISSUE DATE AND TIME: NORMAL
LYMPHOCYTES # BLD AUTO: 3.3 10E3/UL (ref 0.8–5.3)
LYMPHOCYTES NFR BLD AUTO: 49 %
MCH RBC QN AUTO: 29 PG (ref 26.5–33)
MCHC RBC AUTO-ENTMCNC: 34.1 G/DL (ref 31.5–36.5)
MCV RBC AUTO: 85 FL (ref 78–100)
MONOCYTES # BLD AUTO: 0.4 10E3/UL (ref 0–1.3)
MONOCYTES NFR BLD AUTO: 7 %
NEUTROPHILS # BLD AUTO: 2.8 10E3/UL (ref 1.6–8.3)
NEUTROPHILS NFR BLD AUTO: 42 %
NRBC # BLD AUTO: 0 10E3/UL
NRBC BLD AUTO-RTO: 0 /100
PLATELET # BLD AUTO: 67 10E3/UL (ref 150–450)
RBC # BLD AUTO: 4.87 10E6/UL (ref 3.8–5.2)
SPECIMEN EXPIRATION DATE: NORMAL
SPECIMEN EXPIRATION DATE: NORMAL
UNIT ABO/RH: NORMAL
UNIT NUMBER: NORMAL
UNIT STATUS: NORMAL
UNIT TYPE ISBT: 5100
WBC # BLD AUTO: 6.7 10E3/UL (ref 4–11)

## 2024-09-26 PROCEDURE — 85025 COMPLETE CBC W/AUTO DIFF WBC: CPT | Performed by: PHYSICIAN ASSISTANT

## 2024-09-26 PROCEDURE — 86901 BLOOD TYPING SEROLOGIC RH(D): CPT | Performed by: PHYSICIAN ASSISTANT

## 2024-09-26 PROCEDURE — 86900 BLOOD TYPING SEROLOGIC ABO: CPT | Performed by: PHYSICIAN ASSISTANT

## 2024-09-26 PROCEDURE — 36415 COLL VENOUS BLD VENIPUNCTURE: CPT | Performed by: PHYSICIAN ASSISTANT

## 2024-09-26 PROCEDURE — 99213 OFFICE O/P EST LOW 20 MIN: CPT | Mod: 95 | Performed by: INTERNAL MEDICINE

## 2024-09-26 PROCEDURE — 86850 RBC ANTIBODY SCREEN: CPT | Performed by: PHYSICIAN ASSISTANT

## 2024-09-26 ASSESSMENT — PAIN SCALES - GENERAL: PAINLEVEL: NO PAIN (0)

## 2024-09-26 ASSESSMENT — LIFESTYLE VARIABLES: TOBACCO_USE: 1

## 2024-09-26 NOTE — NURSING NOTE
Current patient location: Pt currently pulled over    Is the patient currently in the state of [place]? YES    Visit mode:VIDEO    If the visit is dropped, the patient can be reconnected by: VIDEO VISIT: Text to cell phone:   Telephone Information:   Mobile [phone]       Will anyone else be joining the visit? NO  (If patient encounters technical issues they should call [phone])    How would you like to obtain your AVS? MyChart    Are changes needed to the allergy or medication list? Pt stated no changes to allergies and Pt stated no med changes    Are refills needed on medications prescribed by this physician? NO    Rooming Documentation:  Unable to complete questionnaire(s) due to time    Reason for visit: NITISH MARIEF

## 2024-09-26 NOTE — OR NURSING
Pt called and aware that pt will need plt transfusion prior to colonoscopy.  Pt willing to come in for T/S today within the hour outpt lab draw. Outpt lab aware of approx time and pt info given. Blood bank called and prepare order released and T/S order released. Blood bank aware to order plt for transfusion tomorrow prior to colonoscopy. Pt will arrive at 0830 for transfusion and then colonoscopy.    Detailed exam

## 2024-09-26 NOTE — ANESTHESIA PREPROCEDURE EVALUATION
Anesthesia Pre-Procedure Evaluation    Patient: Daylin Amaya   MRN: [id number] : 1975        Procedure : Procedure(s):  Colonoscopy          Past Medical History:   Diagnosis Date    Gestational diabetes       Past Surgical History:   Procedure Laterality Date     SECTION      HYSTERECTOMY  10/03/2022    vaginal hysterectomy. still has ovaries. removed cervix.      Allergies   Allergen Reactions    Amoxicillin Hives     In adulthood    Sulfa Antibiotics Hives    Clindamycin Diarrhea and Rash      Social History     Tobacco Use    Smoking status: Former     Types: Cigarettes    Smokeless tobacco: Never   Substance Use Topics    Alcohol use: No      Wt Readings from Last 1 Encounters:   24 116.9 kg (257 lb 11.2 oz)        Anesthesia Evaluation   Pt has had prior anesthetic.         ROS/MED HX  ENT/Pulmonary:     (+)                tobacco use, Past use,                       Neurologic:       Cardiovascular:       METS/Exercise Tolerance:     Hematologic:       Musculoskeletal:       GI/Hepatic:       Renal/Genitourinary:       Endo:     (+)               Obesity,       Psychiatric/Substance Use:       Infectious Disease:       Malignancy:       Other:          Physical Exam    Airway        Mallampati: I   TM distance: > 3 FB   Neck ROM: full   Mouth opening: > 3 cm    Respiratory Devices and Support         Dental       (+) Minor Abnormalities - some fillings, tiny chips      Cardiovascular   cardiovascular exam normal          Pulmonary   pulmonary exam normal                OUTSIDE LABS:  CBC:   Lab Results   Component Value Date    WBC 6.5 2024    WBC 6.6 2024    HGB 14.0 2024    HGB 14.5 2024    HCT 41.6 2024    HCT 44.1 2024    PLT 57 (L) 2024    PLT 64 (L) 2024     BMP:   Lab Results   Component Value Date     2024     2023    POTASSIUM 4.1 2024    POTASSIUM 4.2 2023    CHLORIDE 107 2024     "CHLORIDE 105 01/30/2023    CO2 24 07/24/2024    CO2 27 01/30/2023    BUN 11.6 07/24/2024    BUN 16.3 01/30/2023    CR 0.80 07/24/2024    CR 0.84 01/30/2023     (H) 07/24/2024     (H) 01/30/2023     COAGS:   Lab Results   Component Value Date    PTT 28 02/03/2023    INR 1.05 02/03/2023     POC: No results found for: \"BGM\", \"HCG\", \"HCGS\"  HEPATIC:   Lab Results   Component Value Date    ALBUMIN 3.8 07/24/2024    PROTTOTAL 6.6 07/24/2024    ALT 16 07/24/2024    AST 15 07/24/2024    ALKPHOS 72 07/24/2024    BILITOTAL 0.4 07/24/2024     OTHER:   Lab Results   Component Value Date    A1C 5.7 (H) 07/24/2024    SHANIQUE 9.2 07/24/2024    TSH 3.00 07/24/2024       Anesthesia Plan    ASA Status:  3       Anesthesia Type: General.              Consents    Anesthesia Plan(s) and associated risks, benefits, and realistic alternatives discussed. Questions answered and patient/representative(s) expressed understanding.     - Discussed: Risks, Benefits and Alternatives for BOTH SEDATION and the PROCEDURE were discussed     - Discussed with:  Patient            Postoperative Care       PONV prophylaxis: Background Propofol Infusion     Comments:               TIFFANY Katz CRNA    I have reviewed the pertinent notes and labs in the chart from the past 30 days and (re)examined the patient.  Any updates or changes from those notes are reflected in this note.             # Thrombocytopenia: Lowest platelets = 57 in last 2 days, will monitor for bleeding      "

## 2024-09-26 NOTE — PROGRESS NOTES
Hematology/Medical Oncology Follow-up Note    Sep 26, 2024  Virtual Visit Details    Type of service:  Video Visit   Visit start 3:38p  Visit end 3:48  Originating Location (pt. Location): Other parked car  Distant Location (provider location):  On-site  Platform used for Video Visit: Joseluis      Reason for visit:  Daylin Amaya is a 49 year old [employer]  from [place] who who presents for hematologic reevaluation of stable chronic presumptive autoimmune thrombocytopenia.    Impression:  Chronic, stable presumptive autoimmune thrombocytopenia    Recommendation, plan, instructions:    Monitor CBC with platelets every 3-6 months and as needed for unexplained bleeding or bruising  She is having a colonoscopy for positive Cologuard tomorrow.  She will have platelet infusion at her surgeon's request.    We do not consider treatment for autoimmune thrombocytopenia until the platelet count is less than 20,000-30,000 or there is excessive bruising or bleeding.      History of present illness:  Per Dr. Real  In February, 2023, this patient was first evaluated here by Dr. Irving Hayes for thrombocytopenia first identified in May, 2022.  Subsequent serial platelet counts and Immature platelet fractions have revealed stable thrombocytopenia around 55,000 with an increased immature platelet fraction noted.  March, 2023 abdominal ultrasound failed to reveal splenomegaly.    Recent prior hepatitis C antibody, HIV antibody and CMP have been unremarkable.      She does not smoke and uses alcohol infrequently.  She rarely takes ibuprofen.  She does not regularly use aspirin.    Past medical history:  Past Medical History:   Diagnosis Date    Gestational diabetes         Family history:  I have reviewed this patient's family history and updated it with pertinent information if needed.  Family History   Problem Relation Age of Onset    Thyroid Disease Mother     Breast Cancer Mother 76    Lung Cancer  Father     Stomach Cancer Sister 57        STAGE 4    Thyroid Disease Sister     Stomach Cancer Maternal Grandmother     Prostate Cancer Maternal Grandfather     Allergies Son         food allergies, sees Dr Ambrocio    Ovarian Cancer Other         mom's cousin         Medications:  Current Outpatient Medications   Medication Sig Dispense Refill    loratadine (CLARITIN) 10 MG tablet Take 10 mg by mouth daily      phentermine 15 MG capsule Take 1 capsule (15 mg) by mouth every morning. 90 capsule 0     No current facility-administered medications for this visit.       Allergies:  Allergies   Allergen Reactions    Amoxicillin Hives     In adulthood    Sulfa Antibiotics Hives    Clindamycin Diarrhea and Rash       Review of systems:      Pertinent findings are recorded in the history of present illness        Physical examination:  Not examined video visit      I spent 26 minutes on the patient's video visit today.  This included preparation for the visit, face-to-face time with the patient and documentation following the visit.  It did not include teaching or procedure time.    Signed by: Peter E. Friedell, MD

## 2024-09-26 NOTE — PROVIDER NOTIFICATION
09/26/24 1239   Pre-Call   Arrival Time Verified 0830     Dr Knight to review case with low platelet levels. Per MD, have patient arrive 1 hour early to have blood drawn for type and screen and then plan to give 1 unit platelets prior to procedure. Pt updated on time of arrival and will plant to have infusion in department prior to procedure.

## 2024-09-27 ENCOUNTER — HOSPITAL ENCOUNTER (OUTPATIENT)
Facility: CLINIC | Age: 49
Discharge: HOME | End: 2024-09-27
Attending: STUDENT IN AN ORGANIZED HEALTH CARE EDUCATION/TRAINING PROGRAM | Admitting: STUDENT IN AN ORGANIZED HEALTH CARE EDUCATION/TRAINING PROGRAM
Payer: COMMERCIAL

## 2024-09-27 ENCOUNTER — ANESTHESIA (OUTPATIENT)
Dept: GASTROENTEROLOGY | Facility: CLINIC | Age: 49
End: 2024-09-27
Payer: COMMERCIAL

## 2024-09-27 VITALS
HEART RATE: 94 BPM | BODY MASS INDEX: 39.87 KG/M2 | SYSTOLIC BLOOD PRESSURE: 120 MMHG | TEMPERATURE: 98 F | WEIGHT: 254 LBS | DIASTOLIC BLOOD PRESSURE: 90 MMHG | HEIGHT: 67 IN | OXYGEN SATURATION: 98 % | RESPIRATION RATE: 14 BRPM

## 2024-09-27 LAB — COLONOSCOPY: NORMAL

## 2024-09-27 PROCEDURE — 250N000009 HC RX 250: Performed by: NURSE ANESTHETIST, CERTIFIED REGISTERED

## 2024-09-27 PROCEDURE — 45381 COLONOSCOPY SUBMUCOUS NJX: CPT | Mod: PT,XU

## 2024-09-27 PROCEDURE — 88305 TISSUE EXAM BY PATHOLOGIST: CPT | Mod: 26 | Performed by: PATHOLOGY

## 2024-09-27 PROCEDURE — 36430 TRANSFUSION BLD/BLD COMPNT: CPT | Mod: 59

## 2024-09-27 PROCEDURE — 250N000009 HC RX 250: Performed by: PHYSICIAN ASSISTANT

## 2024-09-27 PROCEDURE — 258N000003 HC RX IP 258 OP 636: Performed by: STUDENT IN AN ORGANIZED HEALTH CARE EDUCATION/TRAINING PROGRAM

## 2024-09-27 PROCEDURE — 45384 COLONOSCOPY W/LESION REMOVAL: CPT | Performed by: STUDENT IN AN ORGANIZED HEALTH CARE EDUCATION/TRAINING PROGRAM

## 2024-09-27 PROCEDURE — 250N000011 HC RX IP 250 OP 636: Performed by: NURSE ANESTHETIST, CERTIFIED REGISTERED

## 2024-09-27 PROCEDURE — 370N000017 HC ANESTHESIA TECHNICAL FEE, PER MIN: Performed by: STUDENT IN AN ORGANIZED HEALTH CARE EDUCATION/TRAINING PROGRAM

## 2024-09-27 PROCEDURE — 45385 COLONOSCOPY W/LESION REMOVAL: CPT | Mod: PT | Performed by: STUDENT IN AN ORGANIZED HEALTH CARE EDUCATION/TRAINING PROGRAM

## 2024-09-27 PROCEDURE — 88305 TISSUE EXAM BY PATHOLOGIST: CPT | Mod: TC | Performed by: STUDENT IN AN ORGANIZED HEALTH CARE EDUCATION/TRAINING PROGRAM

## 2024-09-27 PROCEDURE — P9035 PLATELET PHERES LEUKOREDUCED: HCPCS | Performed by: STUDENT IN AN ORGANIZED HEALTH CARE EDUCATION/TRAINING PROGRAM

## 2024-09-27 RX ORDER — FLUMAZENIL 0.1 MG/ML
0.2 INJECTION, SOLUTION INTRAVENOUS
Status: DISCONTINUED | OUTPATIENT
Start: 2024-09-27 | End: 2024-09-27 | Stop reason: HOSPADM

## 2024-09-27 RX ORDER — NALOXONE HYDROCHLORIDE 0.4 MG/ML
0.4 INJECTION, SOLUTION INTRAMUSCULAR; INTRAVENOUS; SUBCUTANEOUS
Status: DISCONTINUED | OUTPATIENT
Start: 2024-09-27 | End: 2024-09-27 | Stop reason: HOSPADM

## 2024-09-27 RX ORDER — SODIUM CHLORIDE 9 MG/ML
INJECTION, SOLUTION INTRAVENOUS CONTINUOUS
Status: DISCONTINUED | OUTPATIENT
Start: 2024-09-27 | End: 2024-09-27 | Stop reason: HOSPADM

## 2024-09-27 RX ORDER — SODIUM CHLORIDE, SODIUM LACTATE, POTASSIUM CHLORIDE, CALCIUM CHLORIDE 600; 310; 30; 20 MG/100ML; MG/100ML; MG/100ML; MG/100ML
INJECTION, SOLUTION INTRAVENOUS CONTINUOUS
Status: DISCONTINUED | OUTPATIENT
Start: 2024-09-27 | End: 2024-09-27 | Stop reason: HOSPADM

## 2024-09-27 RX ORDER — NALOXONE HYDROCHLORIDE 0.4 MG/ML
0.2 INJECTION, SOLUTION INTRAMUSCULAR; INTRAVENOUS; SUBCUTANEOUS
Status: DISCONTINUED | OUTPATIENT
Start: 2024-09-27 | End: 2024-09-27 | Stop reason: HOSPADM

## 2024-09-27 RX ORDER — GLYCOPYRROLATE 0.2 MG/ML
INJECTION, SOLUTION INTRAMUSCULAR; INTRAVENOUS PRN
Status: DISCONTINUED | OUTPATIENT
Start: 2024-09-27 | End: 2024-09-27

## 2024-09-27 RX ORDER — KETAMINE HYDROCHLORIDE 10 MG/ML
INJECTION INTRAMUSCULAR; INTRAVENOUS PRN
Status: DISCONTINUED | OUTPATIENT
Start: 2024-09-27 | End: 2024-09-27

## 2024-09-27 RX ORDER — PROPOFOL 10 MG/ML
INJECTION, EMULSION INTRAVENOUS PRN
Status: DISCONTINUED | OUTPATIENT
Start: 2024-09-27 | End: 2024-09-27

## 2024-09-27 RX ORDER — LIDOCAINE 40 MG/G
CREAM TOPICAL
Status: DISCONTINUED | OUTPATIENT
Start: 2024-09-27 | End: 2024-09-27 | Stop reason: HOSPADM

## 2024-09-27 RX ADMIN — PROPOFOL 150 MG: 10 INJECTION, EMULSION INTRAVENOUS at 10:19

## 2024-09-27 RX ADMIN — PROPOFOL 50 MG: 10 INJECTION, EMULSION INTRAVENOUS at 10:25

## 2024-09-27 RX ADMIN — SODIUM CHLORIDE 1000 ML: 9 INJECTION, SOLUTION INTRAVENOUS at 09:03

## 2024-09-27 RX ADMIN — LIDOCAINE HYDROCHLORIDE 0.1 ML: 10 INJECTION, SOLUTION EPIDURAL; INFILTRATION; INTRACAUDAL; PERINEURAL at 09:01

## 2024-09-27 RX ADMIN — LIDOCAINE HYDROCHLORIDE 0.1 ML: 10 INJECTION, SOLUTION EPIDURAL; INFILTRATION; INTRACAUDAL; PERINEURAL at 09:03

## 2024-09-27 RX ADMIN — GLYCOPYRROLATE 0.2 MG: 0.2 INJECTION, SOLUTION INTRAMUSCULAR; INTRAVENOUS at 10:15

## 2024-09-27 RX ADMIN — PROPOFOL 150 MG: 10 INJECTION, EMULSION INTRAVENOUS at 10:15

## 2024-09-27 RX ADMIN — PROPOFOL 150 MG: 10 INJECTION, EMULSION INTRAVENOUS at 10:17

## 2024-09-27 RX ADMIN — KETAMINE HYDROCHLORIDE 50 MG: 10 INJECTION INTRAMUSCULAR; INTRAVENOUS at 10:15

## 2024-09-27 ASSESSMENT — ACTIVITIES OF DAILY LIVING (ADL)
ADLS_ACUITY_SCORE: 35

## 2024-09-27 NOTE — H&P
[facility]    Pre-Endoscopy History and Physical     Daylin Amaya MRN# [id number]   YOB: 1975 Age: 49 year old     Date of Procedure: 2024  Primary care provider: Tamar Jackson  Type of Endoscopy: Colonoscopy with possible biopsy, possible polypectomy  Reason for Procedure: diagnostic, positive cologuard  Type of Anesthesia Anticipated: Conscious Sedation    HPI:    Daylin is a 49 year old female who will be undergoing the above procedure.      A history and physical has been performed. The patient's medications and allergies have been reviewed. The risks and benefits of the procedure and the sedation options and risks were discussed with the patient.  All questions were answered and informed consent was obtained.      She denies a personal or family history of anesthesia complications or bleeding disorders.     Colonoscopy, first one, diagnostic for positive cologuard. ASA II. Surgical hx of hysterectomy. No family hx of colon cancer. Hx of thrombocytopenia, will get platelet transfusion prior to procedure.     Patient Active Problem List   Diagnosis    Uterine polyp    REBECCA III (cervical intraepithelial neoplasia grade III) with severe dysplasia    Morbid obesity (H)    Idiopathic thrombocytopenic purpura (H)        Past Medical History:   Diagnosis Date    Gestational diabetes         Past Surgical History:   Procedure Laterality Date     SECTION      HYSTERECTOMY  10/03/2022    vaginal hysterectomy. still has ovaries. removed cervix.       Social History     Tobacco Use    Smoking status: Former     Types: Cigarettes    Smokeless tobacco: Never   Substance Use Topics    Alcohol use: No       Family History   Problem Relation Age of Onset    Thyroid Disease Mother     Breast Cancer Mother 76    Lung Cancer Father     Stomach Cancer Sister 57        STAGE 4    Thyroid Disease Sister     Stomach Cancer Maternal Grandmother     Prostate Cancer Maternal  "Grandfather     Allergies Son         food allergies, sees Dr Ambrocio    Ovarian Cancer Other         mom's cousin       Prior to Admission medications    Medication Sig Start Date End Date Taking? Authorizing Provider   loratadine (CLARITIN) 10 MG tablet Take 10 mg by mouth daily   Yes Reported, Patient   phentermine 15 MG capsule Take 1 capsule (15 mg) by mouth every morning. 8/23/24  Yes Manuel, Tamar, DENISE       Allergies   Allergen Reactions    Amoxicillin Hives     In adulthood    Sulfa Antibiotics Hives    Clindamycin Diarrhea and Rash        REVIEW OF SYSTEMS:   5 point ROS negative except as noted above in HPI, including Gen., Resp., CV, GI &  system review.    PHYSICAL EXAM:   LMP 12/15/2017 (Approximate)  Estimated body mass index is 39.78 kg/m  as calculated from the following:    Height as of 9/26/24: 1.702 m (5' 7\").    Weight as of 9/26/24: 115.2 kg (254 lb).   Constitutional: Awake, alert, no acute distress.  Eyes: No scleral icterus.  Conjunctiva are without injection.  ENMT: Mucous membranes moist, dentition and gums are intact.   Neck: Soft, supple, trachea midline.    Endocrine: n/a   Lymphatic: There is no cervical, submandibularadenopathy.  Respiratory: normal efforts on room air  Cardiovascular: extremities warm and well perfused  Abdomen: Non-distended, non-tender,  No masses,  Musculoskeletal: Full range of motion in the upper and lower extremities.    Skin: No skin rashes or lesions to inspection.  No petechia.    Neurologic: alerted and oriented 3x  Psychiatric: The patient's affect is not blunted and mood is appropriate.  DIAGNOSTICS:    Not indicated    IMPRESSION   ASA Class 2 - Mild systemic disease    PLAN:   Plan for Colonoscopy with possible biopsy, possible polypectomy. We discussed the risks, benefits and alternatives and the patient wished to proceed.  Patient is cleared for the above procedure.    The above has been forwarded to the consulting provider.    Bebo Knight, " MD on 9/27/2024 at 8:33 AM  [facility] Surgery

## 2024-09-27 NOTE — LETTER
Daylin Amaya  [address] [address]      September 30, 2024    Dear Daylin,  This letter is written to inform you of the results of your recent colonoscopy.  Your examination showed diverticulosis of your colon in sigmoid colon and polyp(s) in your sigmoid colon. All polyps were removed in their entirety and sent for review by a pathologist. As you will see on the pathology report below, the tissue(s) were polyps consistent with tubulovillous adenoma. Your examination was otherwise without abnormality.    Diverticulosis can be described as small outpouchings (pockets) in your colon wall. This is an entirely benign (non-cancerous) finding.  Adenomatous polyps are entirely benign (non-cancerous); however, patients who have developed these polyps are at an increased risk for developing additional polyps in the future. If these are not eventually removed, there is a risk of developing colon cancer. We will advise more frequent examinations with you because of the risk associated with this type of polyp.    Given these findings, as well as the size of the polyp identified, I recommend that you undergo a repeat colonoscopy in 3 year(s) for surveillance. We will enter you into a recall system so you receive a reminder closer to the time that you are due for repeat examination. Your physician also recommends that you adhere to a high fiber diet indefinitely to promote colon health.     Please remember that this recommendation is made with the understanding that you are not experiencing persistent changes in bowel function, bleeding per rectum, and/or significant abdominal pain. If you experience these symptoms, please contact your primary care provider for a further evaluation.     If you have any questions or concerns about the results of your colonoscopy or the appropriate follow-up, please contact my assistant at [phone]    Sincerely,      Bebo Knight MD   [facility]  "Surgery  ___                    Resulted Orders   Adult Type and Screen   Result Value Ref Range    ABO/RH(D) O POS     Antibody Screen Negative Negative    SPECIMEN EXPIRATION DATE [id number]    Prepare pheresed platelets (unit)   Result Value Ref Range    Blood Component Type Platelets     Product Code S5579X48     Unit Status Transfused     Unit Number [id number]     CODING SYSTEM [id number]     ISSUE DATE AND TIME [id number]     UNIT ABO/RH O+     UNIT TYPE ISBT 5100    Surgical Pathology Exam   Result Value Ref Range    Case Report       Surgical Pathology Report                         Case: [id number]                                  Authorizing Provider:  Bebo Knight MD       Collected:           09/27/2024 10:25 AM          Ordering Location:     [facility]   Received:            09/27/2024 10:39 AM                                 [place]                                                                      Pathologist:           Ludmila Fournier MD                                                                           Specimen:    Large Intestine, Colon, Sigmoid, sigmoid colon polyp                                       Final Diagnosis       Large intestine, sigmoid, polypectomy:  -Tubulovillous adenoma, no evidence of high-grade dysplasia or malignancy          Clinical Information       Procedure:  COLONOSCOPY, WITH LESION EXCISION USING HOT BIOPSY DEVICE  Pre-op Diagnosis: Positive colorectal cancer screening using Cologuard test [R19.5]  Post-op Diagnosis: R19.5 - Positive colorectal cancer screening using Cologuard test [ICD-10-CM]      Gross Description       A(1). Large Intestine, Colon, Sigmoid, sigmoid colon polyp:  The specimen is received in formalin, labeled with the patient's name, medical record number and other identifying information designated \"sigmoid colon polyp\". It consists " of a 1.4 cm polypoid tissue fragment.  Inked black, sectioned and entirely submitted in 1 cassette.   (CAROLE Trevizo) 9/27/2024 2:59 PM       Microscopic Description       A formal microscopic examination is been performed      Performing Labs       The technical component of this testing was completed at [facility].    Stain controls for all stains resulted within this report have been reviewed and show appropriate reactivity.       Case Images

## 2024-09-27 NOTE — ANESTHESIA CARE TRANSFER NOTE
Patient: Daylin Amaya    Procedure: Procedure(s):  COLONOSCOPY, WITH LESION EXCISION USING HOT BIOPSY DEVICE       Diagnosis: Positive colorectal cancer screening using Cologuard test [R19.5]  Diagnosis Additional Information: No value filed.    Anesthesia Type:   General     Note:    Oropharynx: oropharynx clear of all foreign objects and spontaneously breathing  Level of Consciousness: awake  Oxygen Supplementation: room air    Independent Airway: airway patency satisfactory and stable  Dentition: dentition unchanged  Vital Signs Stable: post-procedure vital signs reviewed and stable  Report to RN Given: handoff report given  Patient transferred to: Phase II    Handoff Report: Identifed the Patient, Identified the Reponsible Provider, Reviewed the pertinent medical history, Discussed the surgical course, Reviewed Intra-OP anesthesia mangement and issues during anesthesia, Set expectations for post-procedure period and Allowed opportunity for questions and acknowledgement of understanding      Vitals:  Vitals Value Taken Time   BP     Temp     Pulse     Resp     SpO2         Electronically Signed By: TIFFANY Forrester CRNA  September 27, 2024  10:34 AM

## 2024-09-27 NOTE — OR NURSING
Platelet infusion started by MARK Winters RN and BALA Pascal RN. Vss. And 10 min. Into the infusion. Infusion continues. Doing well. And no c/o. Will reassess and continue infusion.

## 2024-09-27 NOTE — ANESTHESIA POSTPROCEDURE EVALUATION
Patient: Daylin Amaya    Procedure: Procedure(s):  COLONOSCOPY, WITH LESION EXCISION USING HOT BIOPSY DEVICE       Anesthesia Type:  General    Note:  Disposition: Outpatient   Postop Pain Control: Uneventful            Sign Out: Well controlled pain   PONV: No   Neuro/Psych: Uneventful            Sign Out: Acceptable/Baseline neuro status   Airway/Respiratory: Uneventful            Sign Out: Acceptable/Baseline resp. status   CV/Hemodynamics: Uneventful            Sign Out: Acceptable CV status; No obvious hypovolemia; No obvious fluid overload   Other NRE: NONE   DID A NON-ROUTINE EVENT OCCUR? No           Last vitals:  Vitals Value Taken Time   /92 09/27/24 1045   Temp     Pulse 103 09/27/24 1045   Resp 16 09/27/24 1045   SpO2 97 % 09/27/24 1052   Vitals shown include unfiled device data.    Electronically Signed By: TIFFANY Forrester CRNA  September 27, 2024  10:53 AM

## 2024-09-27 NOTE — PROGRESS NOTES
Plt infusion completed. Vss. Iv patent. NS infusing to flush tubing. Doing well. Ready for procedure crna aware. Charting and infusion by Owen gaston

## 2024-09-30 LAB
PATH REPORT.COMMENTS IMP SPEC: NORMAL
PATH REPORT.COMMENTS IMP SPEC: NORMAL
PATH REPORT.FINAL DX SPEC: NORMAL
PATH REPORT.GROSS SPEC: NORMAL
PATH REPORT.MICROSCOPIC SPEC OTHER STN: NORMAL
PATH REPORT.RELEVANT HX SPEC: NORMAL
PHOTO IMAGE: NORMAL

## 2024-12-05 ENCOUNTER — MYC REFILL (OUTPATIENT)
Dept: FAMILY MEDICINE | Facility: CLINIC | Age: 49
End: 2024-12-05
Payer: COMMERCIAL

## 2024-12-05 DIAGNOSIS — E66.01 MORBID OBESITY (H): ICD-10-CM

## 2024-12-05 RX ORDER — PHENTERMINE HYDROCHLORIDE 15 MG/1
15 CAPSULE ORAL EVERY MORNING
Qty: 90 CAPSULE | Refills: 0 | Status: SHIPPED | OUTPATIENT
Start: 2024-12-05

## 2025-01-29 ENCOUNTER — ANCILLARY PROCEDURE (OUTPATIENT)
Dept: GENERAL RADIOLOGY | Facility: CLINIC | Age: 50
End: 2025-01-29
Attending: PHYSICIAN ASSISTANT
Payer: COMMERCIAL

## 2025-01-29 ENCOUNTER — OFFICE VISIT (OUTPATIENT)
Dept: FAMILY MEDICINE | Facility: CLINIC | Age: 50
End: 2025-01-29
Payer: COMMERCIAL

## 2025-01-29 VITALS
HEIGHT: 67 IN | SYSTOLIC BLOOD PRESSURE: 136 MMHG | TEMPERATURE: 97.3 F | OXYGEN SATURATION: 99 % | WEIGHT: 242 LBS | RESPIRATION RATE: 24 BRPM | BODY MASS INDEX: 37.98 KG/M2 | DIASTOLIC BLOOD PRESSURE: 87 MMHG | HEART RATE: 84 BPM

## 2025-01-29 DIAGNOSIS — R05.1 ACUTE COUGH: Primary | ICD-10-CM

## 2025-01-29 DIAGNOSIS — R05.1 ACUTE COUGH: ICD-10-CM

## 2025-01-29 PROCEDURE — 99214 OFFICE O/P EST MOD 30 MIN: CPT | Performed by: PHYSICIAN ASSISTANT

## 2025-01-29 PROCEDURE — 71046 X-RAY EXAM CHEST 2 VIEWS: CPT | Mod: TC | Performed by: STUDENT IN AN ORGANIZED HEALTH CARE EDUCATION/TRAINING PROGRAM

## 2025-01-29 RX ORDER — PREDNISONE 20 MG/1
40 TABLET ORAL DAILY
Qty: 10 TABLET | Refills: 0 | Status: SHIPPED | OUTPATIENT
Start: 2025-01-29 | End: 2025-02-03

## 2025-01-29 RX ORDER — ALBUTEROL SULFATE 90 UG/1
2 INHALANT RESPIRATORY (INHALATION) EVERY 6 HOURS PRN
Qty: 18 G | Refills: 1 | Status: SHIPPED | OUTPATIENT
Start: 2025-01-29

## 2025-01-29 ASSESSMENT — ENCOUNTER SYMPTOMS: COUGH: 1

## 2025-01-29 ASSESSMENT — PAIN SCALES - GENERAL: PAINLEVEL_OUTOF10: NO PAIN (0)

## 2025-01-29 NOTE — PROGRESS NOTES
"  Assessment & Plan     Acute cough  No infiltrate present on chest Xray. Final report will be sent via [employer].   She will treat with prednisone and albuterol. Side effects and how to take the medication discussed.  Continue regular follow up with her primary provider in the future.   - XR Chest 2 Views; Future  - albuterol (PROAIR HFA/PROVENTIL HFA/VENTOLIN HFA) 108 (90 Base) MCG/ACT inhaler; Inhale 2 puffs into the lungs every 6 hours as needed for shortness of breath, wheezing or cough.  - predniSONE (DELTASONE) 20 MG tablet; Take 2 tablets (40 mg) by mouth daily for 5 days.          BMI  Estimated body mass index is 37.9 kg/m  as calculated from the following:    Height as of this encounter: 1.702 m (5' 7\").    Weight as of this encounter: 109.8 kg (242 lb).             Subjective   Daylin is a 49 year old, presenting for the following health issues:  Cough (Cough has been over a month, feels better and now cough came back a week ago with the flu per patient. Requesting X-Ray due to chest tightness and rule out pneumonia. )      1/29/2025     9:19 AM   Additional Questions   Roomed by Pietro SANTAMARIA MA     Cough    History of Present Illness       Reason for visit:  Ongoing cough, chest tightness  Symptom onset:  3-4 weeks ago  Symptom intensity:  Moderate  Symptom progression:  Staying the same  Had these symptoms before:  No   She is taking medications regularly.       Daylin is here today for continued cough.   She has been coughing since December. She was getting better, but then got influenza last week. Her daughter was seen and treated. She had the same symptoms and has been using over the counter medications. They have been somewhat helpful, but cough lingering. She has a tickle, which leads to cough spasms. Very little sputum. Fevers and aches have resolved.               Review of Systems  Constitutional, HEENT, cardiovascular, pulmonary, GI, , musculoskeletal, neuro, skin, endocrine and psych systems are " "negative, except as otherwise noted.      Objective    /87   Pulse 84   Temp 97.3  F (36.3  C) (Tympanic)   Resp 24   Ht 1.702 m (5' 7\")   Wt 109.8 kg (242 lb)   LMP 12/15/2017 (Approximate)   SpO2 99%   Breastfeeding No   BMI 37.90 kg/m    Body mass index is 37.9 kg/m .  Physical Exam   GENERAL: alert and no distress  EYES: Eyes grossly normal to inspection, PERRL and conjunctivae and sclerae normal  HENT: ear canals and TM's normal, nose and mouth without ulcers or lesions  NECK: no adenopathy, no asymmetry, masses, or scars  RESP: coughing with deep inspiration, few scattered wheezes at the bases.   CV: regular rate and rhythm, normal S1 S2, no S3 or S4, no murmur, click or rub, no peripheral edema   MS: no gross musculoskeletal defects noted, no edema  SKIN: no suspicious lesions or rashes  PSYCH: mentation appears normal, affect normal/bright    CXR - Reviewed and interpreted by me no infiltrate, Radiology will also review        Signed Electronically by: Kristen M. Kehr, PA-C    "

## 2025-04-02 ENCOUNTER — LAB (OUTPATIENT)
Dept: LAB | Facility: CLINIC | Age: 50
End: 2025-04-02
Payer: COMMERCIAL

## 2025-04-02 DIAGNOSIS — D69.3 IDIOPATHIC THROMBOCYTOPENIC PURPURA (H): ICD-10-CM

## 2025-04-02 LAB
BASOPHILS # BLD AUTO: 0.1 10E3/UL (ref 0–0.2)
BASOPHILS NFR BLD AUTO: 1 %
EOSINOPHIL # BLD AUTO: 0.2 10E3/UL (ref 0–0.7)
EOSINOPHIL NFR BLD AUTO: 2 %
ERYTHROCYTE [DISTWIDTH] IN BLOOD BY AUTOMATED COUNT: 13.9 % (ref 10–15)
HCT VFR BLD AUTO: 43.4 % (ref 35–47)
HGB BLD-MCNC: 14.4 G/DL (ref 11.7–15.7)
IMM GRANULOCYTES # BLD: 0 10E3/UL
IMM GRANULOCYTES NFR BLD: 0 %
LYMPHOCYTES # BLD AUTO: 3 10E3/UL (ref 0.8–5.3)
LYMPHOCYTES NFR BLD AUTO: 39 %
MCH RBC QN AUTO: 28.2 PG (ref 26.5–33)
MCHC RBC AUTO-ENTMCNC: 33.2 G/DL (ref 31.5–36.5)
MCV RBC AUTO: 85 FL (ref 78–100)
MONOCYTES # BLD AUTO: 0.5 10E3/UL (ref 0–1.3)
MONOCYTES NFR BLD AUTO: 6 %
NEUTROPHILS # BLD AUTO: 4 10E3/UL (ref 1.6–8.3)
NEUTROPHILS NFR BLD AUTO: 52 %
NRBC # BLD AUTO: 0 10E3/UL
NRBC BLD AUTO-RTO: 0 /100
PLATELET # BLD AUTO: 65 10E3/UL (ref 150–450)
RBC # BLD AUTO: 5.11 10E6/UL (ref 3.8–5.2)
WBC # BLD AUTO: 7.8 10E3/UL (ref 4–11)

## 2025-04-02 PROCEDURE — 85025 COMPLETE CBC W/AUTO DIFF WBC: CPT

## 2025-04-02 PROCEDURE — 36415 COLL VENOUS BLD VENIPUNCTURE: CPT

## 2025-04-03 ENCOUNTER — VIRTUAL VISIT (OUTPATIENT)
Dept: ONCOLOGY | Facility: CLINIC | Age: 50
End: 2025-04-03
Attending: NURSE PRACTITIONER
Payer: COMMERCIAL

## 2025-04-03 VITALS — HEIGHT: 67 IN | WEIGHT: 242 LBS | BODY MASS INDEX: 37.98 KG/M2

## 2025-04-03 DIAGNOSIS — D69.3 IDIOPATHIC THROMBOCYTOPENIC PURPURA (H): Primary | ICD-10-CM

## 2025-04-03 ASSESSMENT — PAIN SCALES - GENERAL: PAINLEVEL_OUTOF10: NO PAIN (0)

## 2025-04-03 NOTE — PROGRESS NOTES
Virtual Visit Details    Type of service: Telephone Visit     Originating Location (pt. Location): Home    Distant Location (provider location):  On-site  Platform used for Video Visit: Telephone (unable to see pt on video through [employer])

## 2025-04-03 NOTE — LETTER
4/3/2025      Daylin Amaya  [address] [address]      Dear Colleague,    Thank you for referring your patient, Daylin Amaya, to the [facility]. Please see a copy of my visit note below.        Is the patient currently in the state of [place]? YES    Visit mode: Telephone    If the visit is dropped, the patient can be reconnected by:VIDEO VISIT: Send to e-mail at: [email or web address]    Will anyone else be joining the visit? NO  (If patient encounters technical issues they should call [phone])    Are changes needed to the allergy or medication list? No    Are refills needed on medications prescribed by this physician? NO    Rooming Documentation:  Questionnaire(s) completed    Reason for visit: Video Visit (Follow Up)    Yamileth Guo VVF       Virtual Visit Details    Type of service: Telephone Visit     Originating Location (pt. Location): Home    Distant Location (provider location):  On-site  Platform used for Video Visit: Telephone (unable to see pt on video through [employer])      [facility] Hematology and Oncology Outpatient Progress Note    Patient: Daylin Amaya  MRN: [id number]  Date of Service: Apr 3, 2025          Reason for Visit    Chronic thrombocytopenia, presumably autoimmune ITP    Primary Hematologist: Dr. Friedell    Telephone visit (technical issues with attempted video visit in [employer])      Assessment/Plan  #.  Chronic, presumptive autoimmune thrombocytopenia - stable  Noted since 2022. Work-up ruled out other etiology, so presumably autoimmune.   No bleeding.     Platelets remain stable 50-60K range. Rest CBC WNL.    Plan:  -Monitor CBC with platelets every 6 months and as needed for unexplained bleeding or bruising  -We do not consider treatment for autoimmune thrombocytopenia until the platelet count is less than 20,000-30,000 or there is excessive bruising or bleeding.  -Avoid NSAIDs, ASA, excessive ETOH which would  increase risks  -Perisurgical interventions will depend on risk of surgical bleeding, can review in Hematology as needed  -Return in 1 yr     History of Present Illness/ Interval History    Ms. Daylin Amaya is a 49 year old with chronic ITP, likely autoimmune. Returns for routine 6-mth follow-up.     No bleeding/bruising. Chronic mild petechiae over distal legs, stable.  She had colonoscopy 9/2024 and surgeon requested platelet transfusion prior (platelets 57K). Did well with procedure, no bleeding.    ECOG Performance    0      Oncology History/Treatment  Diagnosis/Stage:   2/2023: thrombocytopenia, likely autoimmune  -thrombocytopenia first identified in May, 2022.  Subsequent serial platelet counts and Immature platelet fractions have revealed stable thrombocytopenia around 55K with an increased immature platelet fraction noted.    -3/2023 abdominal ultrasound: no splenomegaly.  -Hepatitis C antibody, HIV antibody and CMP have been unremarkable.  -No significant ETOH use, NSAID/ASA use     Treatment:  Monitor      Physical Exam    GENERAL: Alert and oriented to time place and person.       Lab Results    Recent Results (from the past week)   CBC with platelets and differential   Result Value Ref Range    WBC Count 7.8 4.0 - 11.0 10e3/uL    RBC Count 5.11 3.80 - 5.20 10e6/uL    Hemoglobin 14.4 11.7 - 15.7 g/dL    Hematocrit 43.4 35.0 - 47.0 %    MCV 85 78 - 100 fL    MCH 28.2 26.5 - 33.0 pg    MCHC 33.2 31.5 - 36.5 g/dL    RDW 13.9 10.0 - 15.0 %    Platelet Count 65 (L) 150 - 450 10e3/uL    % Neutrophils 52 %    % Lymphocytes 39 %    % Monocytes 6 %    % Eosinophils 2 %    % Basophils 1 %    % Immature Granulocytes 0 %    NRBCs per 100 WBC 0 <1 /100    Absolute Neutrophils 4.0 1.6 - 8.3 10e3/uL    Absolute Lymphocytes 3.0 0.8 - 5.3 10e3/uL    Absolute Monocytes 0.5 0.0 - 1.3 10e3/uL    Absolute Eosinophils 0.2 0.0 - 0.7 10e3/uL    Absolute Basophils 0.1 0.0 - 0.2 10e3/uL    Absolute Immature Granulocytes 0.0  <=0.4 10e3/uL    Absolute NRBCs 0.0 10e3/uL       Imaging    No results found.    Billing  Telephone start time: 1447; End time: 1455 (Total: 8 min)      Signed by: Ebony Brown NP      Again, thank you for allowing me to participate in the care of your patient.        Sincerely,        Ebony Brown NP    Electronically signed

## 2025-04-03 NOTE — LETTER
4/3/2025      Daylin Amaya  [address] [address]      Dear Colleague,    Thank you for referring your patient, Daylin Amaya, to the [facility]. Please see a copy of my visit note below.        Is the patient currently in the state of [place]? YES    Visit mode: Telephone    If the visit is dropped, the patient can be reconnected by:VIDEO VISIT: Send to e-mail at: [email or web address]    Will anyone else be joining the visit? NO  (If patient encounters technical issues they should call [phone]    Are changes needed to the allergy or medication list? No    Are refills needed on medications prescribed by this physician? NO    Rooming Documentation:  Questionnaire(s) completed    Reason for visit: Video Visit (Follow Up)    Yamileth Guo VVF       Virtual Visit Details    Type of service: Telephone Visit     Originating Location (pt. Location): Home    Distant Location (provider location):  On-site  Platform used for Video Visit: Telephone (unable to see pt on video through [employer])      [facility] Hematology and Oncology Outpatient Progress Note    Patient: Daylin Amaya  MRN: [id number]  Date of Service: Apr 3, 2025          Reason for Visit    Chronic thrombocytopenia, presumably autoimmune ITP    Primary Hematologist: Dr. Friedell    Telephone visit (technical issues with attempted video visit in [employer])      Assessment/Plan  #.  Chronic, presumptive autoimmune thrombocytopenia - stable  Noted since 2022. Work-up ruled out other etiology, so presumably autoimmune.   No bleeding.     Platelets remain stable 50-60K range. Rest CBC WNL.    Plan:  -Monitor CBC with platelets every 6 months and as needed for unexplained bleeding or bruising  -We do not consider treatment for autoimmune thrombocytopenia until the platelet count is less than 20,000-30,000 or there is excessive bruising or bleeding.  -Avoid NSAIDs, ASA, excessive ETOH which would  increase risks  -Perisurgical interventions will depend on risk of surgical bleeding, can review in Hematology as needed  -Return in 1 yr     History of Present Illness/ Interval History    Ms. Daylin Amaya is a 49 year old with chronic ITP, likely autoimmune. Returns for routine 6-mth follow-up.     No bleeding/bruising. Chronic mild petechiae over distal legs, stable.  She had colonoscopy 9/2024 and surgeon requested platelet transfusion prior (platelets 57K). Did well with procedure, no bleeding.    ECOG Performance    0      Oncology History/Treatment  Diagnosis/Stage:   2/2023: thrombocytopenia, likely autoimmune  -thrombocytopenia first identified in May, 2022.  Subsequent serial platelet counts and Immature platelet fractions have revealed stable thrombocytopenia around 55K with an increased immature platelet fraction noted.    -3/2023 abdominal ultrasound: no splenomegaly.  -Hepatitis C antibody, HIV antibody and CMP have been unremarkable.  -No significant ETOH use, NSAID/ASA use     Treatment:  Monitor      Physical Exam    GENERAL: Alert and oriented to time place and person.       Lab Results    Recent Results (from the past week)   CBC with platelets and differential   Result Value Ref Range    WBC Count 7.8 4.0 - 11.0 10e3/uL    RBC Count 5.11 3.80 - 5.20 10e6/uL    Hemoglobin 14.4 11.7 - 15.7 g/dL    Hematocrit 43.4 35.0 - 47.0 %    MCV 85 78 - 100 fL    MCH 28.2 26.5 - 33.0 pg    MCHC 33.2 31.5 - 36.5 g/dL    RDW 13.9 10.0 - 15.0 %    Platelet Count 65 (L) 150 - 450 10e3/uL    % Neutrophils 52 %    % Lymphocytes 39 %    % Monocytes 6 %    % Eosinophils 2 %    % Basophils 1 %    % Immature Granulocytes 0 %    NRBCs per 100 WBC 0 <1 /100    Absolute Neutrophils 4.0 1.6 - 8.3 10e3/uL    Absolute Lymphocytes 3.0 0.8 - 5.3 10e3/uL    Absolute Monocytes 0.5 0.0 - 1.3 10e3/uL    Absolute Eosinophils 0.2 0.0 - 0.7 10e3/uL    Absolute Basophils 0.1 0.0 - 0.2 10e3/uL    Absolute Immature Granulocytes 0.0  <=0.4 10e3/uL    Absolute NRBCs 0.0 10e3/uL       Imaging    No results found.    Billing  Telephone start time: 1447; End time: 1455 (Total: 8 min)      Signed by: Ebony Brown NP      Again, thank you for allowing me to participate in the care of your patient.        Sincerely,        Ebony Brown NP    Electronically signed

## 2025-04-03 NOTE — PROGRESS NOTES
Is the patient currently in the state of [place]? YES    Visit mode: Telephone    If the visit is dropped, the patient can be reconnected by:VIDEO VISIT: Send to e-mail at: [email or web address]    Will anyone else be joining the visit? NO  (If patient encounters technical issues they should call [phone]    Are changes needed to the allergy or medication list? No    Are refills needed on medications prescribed by this physician? NO    Rooming Documentation:  Questionnaire(s) completed    Reason for visit: Video Visit (Follow Up)    Yamileth RUANO

## 2025-04-03 NOTE — PROGRESS NOTES
[facility] Hematology and Oncology Outpatient Progress Note    Patient: Daylin Amaya  MRN: [id number]  Date of Service: Apr 3, 2025          Reason for Visit    Chronic thrombocytopenia, presumably autoimmune ITP    Primary Hematologist: Dr. Friedell    Telephone visit (technical issues with attempted video visit in [facility])      Assessment/Plan  #.  Chronic, presumptive autoimmune thrombocytopenia - stable  Noted since 2022. Work-up ruled out other etiology, so presumably autoimmune.   No bleeding.     Platelets remain stable 50-60K range. Rest CBC WNL.    Plan:  -Monitor CBC with platelets every 6 months and as needed for unexplained bleeding or bruising  -We do not consider treatment for autoimmune thrombocytopenia until the platelet count is less than 20,000-30,000 or there is excessive bruising or bleeding.  -Avoid NSAIDs, ASA, excessive ETOH which would increase risks  -Perisurgical interventions will depend on risk of surgical bleeding, can review in Hematology as needed  -Return in 1 yr     History of Present Illness/ Interval History    Ms. Daylin Amaya is a 49 year old with chronic ITP, likely autoimmune. Returns for routine 6-mth follow-up.     No bleeding/bruising. Chronic mild petechiae over distal legs, stable.  She had colonoscopy 9/2024 and surgeon requested platelet transfusion prior (platelets 57K). Did well with procedure, no bleeding.    ECOG Performance    0      Oncology History/Treatment  Diagnosis/Stage:   2/2023: thrombocytopenia, likely autoimmune  -thrombocytopenia first identified in May, 2022.  Subsequent serial platelet counts and Immature platelet fractions have revealed stable thrombocytopenia around 55K with an increased immature platelet fraction noted.    -3/2023 abdominal ultrasound: no splenomegaly.  -Hepatitis C antibody, HIV antibody and CMP have been unremarkable.  -No significant ETOH use, NSAID/ASA use     Treatment:  Monitor      Physical Exam    GENERAL:  Alert and oriented to time place and person.       Lab Results    Recent Results (from the past week)   CBC with platelets and differential   Result Value Ref Range    WBC Count 7.8 4.0 - 11.0 10e3/uL    RBC Count 5.11 3.80 - 5.20 10e6/uL    Hemoglobin 14.4 11.7 - 15.7 g/dL    Hematocrit 43.4 35.0 - 47.0 %    MCV 85 78 - 100 fL    MCH 28.2 26.5 - 33.0 pg    MCHC 33.2 31.5 - 36.5 g/dL    RDW 13.9 10.0 - 15.0 %    Platelet Count 65 (L) 150 - 450 10e3/uL    % Neutrophils 52 %    % Lymphocytes 39 %    % Monocytes 6 %    % Eosinophils 2 %    % Basophils 1 %    % Immature Granulocytes 0 %    NRBCs per 100 WBC 0 <1 /100    Absolute Neutrophils 4.0 1.6 - 8.3 10e3/uL    Absolute Lymphocytes 3.0 0.8 - 5.3 10e3/uL    Absolute Monocytes 0.5 0.0 - 1.3 10e3/uL    Absolute Eosinophils 0.2 0.0 - 0.7 10e3/uL    Absolute Basophils 0.1 0.0 - 0.2 10e3/uL    Absolute Immature Granulocytes 0.0 <=0.4 10e3/uL    Absolute NRBCs 0.0 10e3/uL       Imaging    No results found.    Billing  Telephone start time: 1447; End time: 1455 (Total: 8 min)      Signed by: Ebony Brown NP

## 2025-07-01 ENCOUNTER — PATIENT OUTREACH (OUTPATIENT)
Dept: CARE COORDINATION | Facility: CLINIC | Age: 50
End: 2025-07-01
Payer: COMMERCIAL

## 2025-07-15 ENCOUNTER — PATIENT OUTREACH (OUTPATIENT)
Dept: CARE COORDINATION | Facility: CLINIC | Age: 50
End: 2025-07-15
Payer: COMMERCIAL

## (undated) DEVICE — ESU ENDO FORCEP BX HOT

## (undated) RX ORDER — GLYCOPYRROLATE 0.2 MG/ML
INJECTION, SOLUTION INTRAMUSCULAR; INTRAVENOUS
Status: DISPENSED
Start: 2024-09-27

## (undated) RX ORDER — PROPOFOL 10 MG/ML
INJECTION, EMULSION INTRAVENOUS
Status: DISPENSED
Start: 2024-09-27

## (undated) RX ORDER — LIDOCAINE HYDROCHLORIDE 10 MG/ML
INJECTION, SOLUTION EPIDURAL; INFILTRATION; INTRACAUDAL; PERINEURAL
Status: DISPENSED
Start: 2024-09-27